# Patient Record
Sex: FEMALE | Race: BLACK OR AFRICAN AMERICAN | NOT HISPANIC OR LATINO | Employment: OTHER | URBAN - METROPOLITAN AREA
[De-identification: names, ages, dates, MRNs, and addresses within clinical notes are randomized per-mention and may not be internally consistent; named-entity substitution may affect disease eponyms.]

---

## 2024-10-07 ENCOUNTER — PATIENT MESSAGE (OUTPATIENT)
Dept: GASTROENTEROLOGY | Facility: CLINIC | Age: 64
End: 2024-10-07
Payer: MEDICARE

## 2024-10-08 ENCOUNTER — PATIENT MESSAGE (OUTPATIENT)
Dept: BARIATRICS | Facility: CLINIC | Age: 64
End: 2024-10-08
Payer: MEDICARE

## 2024-10-09 ENCOUNTER — HOSPITAL ENCOUNTER (OUTPATIENT)
Facility: HOSPITAL | Age: 64
Discharge: HOME | End: 2024-10-09
Attending: STUDENT IN AN ORGANIZED HEALTH CARE EDUCATION/TRAINING PROGRAM | Admitting: STUDENT IN AN ORGANIZED HEALTH CARE EDUCATION/TRAINING PROGRAM
Payer: MEDICARE

## 2024-10-09 VITALS
TEMPERATURE: 98 F | HEIGHT: 66 IN | WEIGHT: 262 LBS | SYSTOLIC BLOOD PRESSURE: 144 MMHG | DIASTOLIC BLOOD PRESSURE: 63 MMHG | HEART RATE: 61 BPM | BODY MASS INDEX: 42.11 KG/M2 | OXYGEN SATURATION: 99 % | RESPIRATION RATE: 18 BRPM

## 2024-10-09 DIAGNOSIS — Z12.11 COLON CANCER SCREENING: ICD-10-CM

## 2024-10-09 LAB
POCT GLUCOSE: 184 MG/DL (ref 70–110)
POCT GLUCOSE: 184 MG/DL (ref 70–110)

## 2024-10-09 PROCEDURE — 45385 COLONOSCOPY W/LESION REMOVAL: CPT | Mod: PT,,, | Performed by: STUDENT IN AN ORGANIZED HEALTH CARE EDUCATION/TRAINING PROGRAM

## 2024-10-09 PROCEDURE — 37000008 HC ANESTHESIA 1ST 15 MINUTES: Performed by: STUDENT IN AN ORGANIZED HEALTH CARE EDUCATION/TRAINING PROGRAM

## 2024-10-09 PROCEDURE — 88305 TISSUE EXAM BY PATHOLOGIST: CPT | Performed by: PATHOLOGY

## 2024-10-09 PROCEDURE — 88305 TISSUE EXAM BY PATHOLOGIST: CPT | Mod: 26,,, | Performed by: PATHOLOGY

## 2024-10-09 PROCEDURE — 82962 GLUCOSE BLOOD TEST: CPT | Performed by: STUDENT IN AN ORGANIZED HEALTH CARE EDUCATION/TRAINING PROGRAM

## 2024-10-09 PROCEDURE — 37000009 HC ANESTHESIA EA ADD 15 MINS: Performed by: STUDENT IN AN ORGANIZED HEALTH CARE EDUCATION/TRAINING PROGRAM

## 2024-10-09 PROCEDURE — 45385 COLONOSCOPY W/LESION REMOVAL: CPT | Mod: PT | Performed by: STUDENT IN AN ORGANIZED HEALTH CARE EDUCATION/TRAINING PROGRAM

## 2024-10-09 PROCEDURE — 27201089 HC SNARE, DISP (ANY): Performed by: STUDENT IN AN ORGANIZED HEALTH CARE EDUCATION/TRAINING PROGRAM

## 2024-10-09 RX ORDER — SODIUM CHLORIDE 0.9 % (FLUSH) 0.9 %
3 SYRINGE (ML) INJECTION
Status: DISCONTINUED | OUTPATIENT
Start: 2024-10-09 | End: 2024-10-09 | Stop reason: HOSPADM

## 2024-10-09 RX ORDER — GLUCAGON 1 MG
1 KIT INJECTION
Status: DISCONTINUED | OUTPATIENT
Start: 2024-10-09 | End: 2024-10-09 | Stop reason: HOSPADM

## 2024-10-09 RX ORDER — SODIUM CHLORIDE 9 MG/ML
INJECTION, SOLUTION INTRAVENOUS CONTINUOUS
Status: DISCONTINUED | OUTPATIENT
Start: 2024-10-09 | End: 2024-10-09 | Stop reason: HOSPADM

## 2024-10-09 NOTE — PROVATION PATIENT INSTRUCTIONS
Discharge Summary/Instructions after an Endoscopic Procedure  Patient Name: Africa Burgos  Patient MRN: [id number]  Patient   YOB: 1960 Wednesday, October 9, 2024  Davi Vo MD  Dear patient,  As a result of recent federal legislation (The Federal Cures Act), you may   receive lab or pathology results from your procedure in your [facility]   account before your physician is able to contact you. Your physician or   their representative will relay the results to you with their   recommendations at their soonest availability.  Thank you,  RESTRICTIONS:  During your procedure today, you received medications for sedation.  These   medications may affect your judgment, balance and coordination.  Therefore,   for 24 hours, you have the following restrictions:   - DO NOT drive a car, operate machinery, make legal/financial decisions,   sign important papers or drink alcohol.    ACTIVITY:  Today: no heavy lifting, straining or running due to procedural   sedation/anesthesia.  The following day: return to full activity including work.  DIET:  Eat and drink normally unless instructed otherwise.     TREATMENT FOR COMMON SIDE EFFECTS:  - Mild abdominal pain, nausea, belching, bloating or excessive gas:  rest,   eat lightly and use a heating pad.  - Sore Throat: treat with throat lozenges and/or gargle with warm salt   water.  - Because air was used during the procedure, expelling large amounts of air   from your rectum or belching is normal.  - If a bowel prep was taken, you may not have a bowel movement for 1-3 days.    This is normal.  SYMPTOMS TO WATCH FOR AND REPORT TO YOUR PHYSICIAN:  1. Abdominal pain or bloating, other than gas cramps.  2. Chest pain.  3. Back pain.  4. Signs of infection such as: chills or fever occurring within 24 hours   after the procedure.  5. Rectal bleeding, which would show as bright red, maroon, or black stools.   (A tablespoon of blood from the rectum is not serious,  especially if   hemorrhoids are present.)  6. Vomiting.  7. Weakness or dizziness.  GO DIRECTLY TO THE NEAREST EMERGENCY ROOM IF YOU HAVE ANY OF THE FOLLOWING:      Difficulty breathing              Chills and/or fever over 101 F   Persistent vomiting and/or vomiting blood   Severe abdominal pain   Severe chest pain   Black, tarry stools   Bleeding- more than one tablespoon   Any other symptom or condition that you feel may need urgent attention  Your doctor recommends these additional instructions:  If any biopsies were taken, your doctors clinic will contact you in 1 to 2   weeks with any results.  - Discharge patient to home (ambulatory).   - Patient has a contact number available for emergencies.  The signs and   symptoms of potential delayed complications were discussed with the   patient.  Return to normal activities tomorrow.  Written discharge   instructions were provided to the patient.   - Resume previous diet.    - Return to primary care physician as previously scheduled.   - Resume anticoagulation tomorrow  - Repeat colonoscopy in 1 year for surveillance.  For questions, problems or results please call your physician - Davi Vo MD at Work:  [phone].  [facility], EMERGENCY ROOM PHONE NUMBER: [phone]  IF A COMPLICATION OR EMERGENCY SITUATION ARISES AND YOU ARE UNABLE TO REACH   YOUR PHYSICIAN - GO DIRECTLY TO THE EMERGENCY ROOM.  MD Davi Hancock MD  10/9/2024 10:14:17 AM  This report has been verified and signed electronically.  Dear patient,  As a result of recent federal legislation (The Federal Cures Act), you may   receive lab or pathology results from your procedure in your [facility]   account before your physician is able to contact you. Your physician or   their representative will relay the results to you with their   recommendations at their soonest availability.  Thank you,  PROVATION

## 2024-10-09 NOTE — H&P
Short Stay Endoscopy History and Physical    PCP - Efren Small MD    Procedure - Colonoscopy  ASA - per anesthesia  Mallampati - per anesthesia  History of Anesthesia problems - no  Family history Anesthesia problems -  no   Plan of anesthesia - General    HPI:  This is a 63 y.o. female here for evaluation of : screening, previous colonoscopy in 2018 could not reach cecum due to redundant colon and looping.     ROS:  Constitutional: No fevers, chills  CV: No chest pain  Pulm: No shortness of breath  GI: see HPI  Derm: No rash    Medical History:  has a past medical history of Abnormal thyroid blood test (11/30/2016), Anxiety and depression (08/21/2014), Chronic hepatitis C without hepatic coma (07/10/2013), Dehiscence of operative wound, Diabetes mellitus, Encounter for blood transfusion, Enterocutaneous fistula, GERD (gastroesophageal reflux disease), Hepatitis C, Hernia of unspecified site of abdominal cavity without mention of obstruction or gangrene, Hypertension, Liver fibrosis (12/06/2018), Lupus, and Sleep apnea.    Surgical History:  has a past surgical history that includes Small intestine surgery; Tubal ligation; Appendectomy; Inguinal hernia repair (Right); Colonoscopy; Upper gastrointestinal endoscopy; Liver biopsy; Colonoscopy (N/A, 12/28/2018); Left heart catheterization (Left, 01/13/2021); Total knee arthroplasty (Right, 06/13/2022); Knee surgery; and ablation, svt, accessory pathway (N/A, 2/1/2024).    Family History: family history includes Diabetes in her mother; Hypertension in her mother; Kidney failure in her father; Lung cancer in her paternal grandfather.. Otherwise no colon cancer, inflammatory bowel disease, or GI malignancies.    Social History:  reports that she has never smoked. She has never been exposed to tobacco smoke. She has never used smokeless tobacco. She reports current alcohol use. She reports that she does not use drugs.    Review of patient's allergies  indicates:  No Known Allergies    Medications:   Medications Prior to Admission   Medication Sig Dispense Refill Last Dose/Taking    atenoloL (TENORMIN) 100 MG tablet Take 100 mg by mouth once daily.   10/8/2024    atorvastatin (LIPITOR) 20 MG tablet Take 1 tablet (20 mg total) by mouth once daily. 90 tablet 3 10/8/2024    chlorthalidone (HYGROTEN) 25 MG Tab Take 25 mg by mouth.   10/8/2024    enoxaparin (LOVENOX) 120 mg/0.8 mL Syrg Inject 0.8 mLs (120 mg total) into the skin 2 (two) times daily. 20 each 1 10/9/2024 Morning    glipiZIDE (GLUCOTROL) 5 MG tablet    10/8/2024    JANUVIA 100 mg Tab TAKE 1 TABLET EVERY DAY BY ORAL ROUTE FOR 90 DAYS.   10/8/2024    JARDIANCE 10 mg tablet Take 10 mg by mouth.   10/8/2024    metFORMIN (GLUCOPHAGE) 500 MG tablet Take 500 mg by mouth.   10/8/2024    nortriptyline (PAMELOR) 25 MG capsule Take 25 mg by mouth every evening.   10/8/2024    pregabalin (LYRICA) 200 MG Cap TAKE 1 CAPSULE(S) BY MOUTH TWO TIMES DAILY   10/8/2024    ACCU-CHEK GRZEGORZ PLUS TEST STRP Strp TEST BLOOD SUGAR ONCCE OR TWICE DAILY       ACCU-CHEK GUIDE ME GLUCOSE MTR Misc use as directed       ACCU-CHEK SOFTCLIX LANCETS Misc        apixaban (ELIQUIS) 5 mg Tab Take 1 tablet (5 mg total) by mouth 2 (two) times daily. 60 tablet 3 10/7/2024    cholecalciferol, vitamin D3, 1,250 mcg (50,000 unit) Tab Take 1,250 mcg by mouth every 7 days. 12 tablet 1     clotrimazole (LOTRIMIN) 1 % cream SMARTSIG:Topical Morning-Evening       colchicine (COLCRYS) 0.6 mg tablet Take 1 tablet (0.6 mg total) by mouth 2 (two) times daily. 30 tablet 0     dicyclomine (BENTYL) 10 MG capsule Take 10 mg by mouth 4 (four) times daily before meals and nightly.       GAVILYTE-C 240-22.72-6.72 -5.84 gram SolR SMARTSI Milliliter(s) By Mouth Daily       LIDOcaine (LIDODERM) 5 % Place 1 patch onto the skin once daily. Remove & Discard patch within 12 hours or as directed by MD 15 patch 0     linaCLOtide (LINZESS) 72 mcg Cap capsule Take 1  capsule by mouth once daily. As needed            Physical Exam:    Vital Signs:   Vitals:    10/09/24 0747   BP: 133/68   Pulse: 61   Resp: 16   Temp: 98.1 °F (36.7 °C)       General Appearance: Well appearing in no acute distress  Eyes:    No scleral icterus  ENT: lips and tongue normal  Lungs: no use of accessory muscles  Heart:  normal rate, regular rhythm  Abdomen: Soft, non tender, non distended   Extremities: no edema  Skin: No rash      Labs:  Lab Results   Component Value Date    WBC 10.28 08/09/2024    HGB 12.6 08/09/2024    HCT 38.5 08/09/2024     08/09/2024    CHOL 159 12/19/2023    TRIG 154 (H) 12/19/2023    HDL 41 12/19/2023    ALT 13 08/09/2024    AST 13 08/09/2024     08/09/2024    K 4.4 08/09/2024     08/09/2024    CREATININE 1.0 08/09/2024    BUN 13 08/09/2024    CO2 24 08/09/2024    TSH 3.521 12/19/2023    INR 1.03 07/13/2024    HGBA1C 9.1 (H) 06/22/2024       I have explained the risks and benefits of endoscopy procedures to the patient including but not limited to bleeding, perforation, infection, and death.  The patient was asked if they understand and allowed to ask any further questions to their satisfaction.    Davi Vo MD

## 2024-10-10 LAB
FINAL PATHOLOGIC DIAGNOSIS: NORMAL
GROSS: NORMAL
Lab: NORMAL

## 2024-10-17 NOTE — PROGRESS NOTES
Dear Ms. Ricci Burgos:    It was my pleasure seeing you at the time of your recent colonoscopy at [facility].    I am happy to report the polyp(s) we removed were benign. The polyp(s) were Tubular adenoma without evidence of cancer or suspicious change.    Based on these findings and the findings at the time of your procedure, I recommend you follow-up with a repeat colonoscopy in approximately 1 Year    I will forward a copy of this letter to your primary care/referring physician. Please do not hesitate to contact me if you have any questions regarding this letter.    Sincerely,    Davi Vo MD

## 2024-10-18 ENCOUNTER — ANESTHESIA EVENT (OUTPATIENT)
Dept: SURGERY | Facility: OTHER | Age: 64
End: 2024-10-18
Payer: MEDICARE

## 2024-10-18 ENCOUNTER — HOSPITAL ENCOUNTER (OUTPATIENT)
Dept: PREADMISSION TESTING | Facility: OTHER | Age: 64
Discharge: HOME | End: 2024-10-18
Attending: OBSTETRICS & GYNECOLOGY
Payer: MEDICARE

## 2024-10-18 VITALS
WEIGHT: 268 LBS | DIASTOLIC BLOOD PRESSURE: 81 MMHG | BODY MASS INDEX: 43.07 KG/M2 | HEART RATE: 63 BPM | OXYGEN SATURATION: 98 % | RESPIRATION RATE: 16 BRPM | SYSTOLIC BLOOD PRESSURE: 166 MMHG | TEMPERATURE: 98 F | HEIGHT: 66 IN

## 2024-10-18 RX ORDER — SODIUM CHLORIDE, SODIUM LACTATE, POTASSIUM CHLORIDE, CALCIUM CHLORIDE 600; 310; 30; 20 MG/100ML; MG/100ML; MG/100ML; MG/100ML
INJECTION, SOLUTION INTRAVENOUS CONTINUOUS
Status: CANCELLED | OUTPATIENT
Start: 2024-10-18

## 2024-10-18 RX ORDER — CHOLECALCIFEROL (VITAMIN D3) 1250 MCG
1250 TABLET ORAL
COMMUNITY

## 2024-10-18 RX ORDER — LIDOCAINE HYDROCHLORIDE 10 MG/ML
0.5 INJECTION, SOLUTION EPIDURAL; INFILTRATION; INTRACAUDAL; PERINEURAL ONCE
Status: CANCELLED | OUTPATIENT
Start: 2024-10-18 | End: 2024-10-18

## 2024-10-18 NOTE — ANESTHESIA PREPROCEDURE EVALUATION
10/18/2024  Africa Burgos is a 63 y.o., female.      Pre-op Assessment    I have reviewed the Patient Summary Reports.     I have reviewed the Nursing Notes. I have reviewed the NPO Status.   I have reviewed the Medications.     Review of Systems  Anesthesia Hx:  No previous Anesthesia             Denies Family Hx of Anesthesia complications.    Denies Personal Hx of Anesthesia complications.                    Social:  Non-Smoker       Hematology/Oncology:  Hematology Normal   Oncology Normal                                   EENT/Dental:  EENT/Dental Normal           Cardiovascular:     Hypertension, well controlled    Dysrhythmias           Ablation 2024 for SVT                           Pulmonary:        Sleep Apnea PE in past  Will wean Eliquis               Renal/:  Renal/ Normal                 Hepatic/GI:     GERD Liver Disease, Hepatitis, C Treated Hep C             Musculoskeletal:  Arthritis               Neurological:  Neurology Normal                                      Endocrine:  Diabetes, type 2         Morbid Obesity / BMI > 40  Dermatological:  Skin Normal    Psych:  Psychiatric History  depression              Physical Exam  General: Alert and Oriented    Airway:  Mallampati: II   Mouth Opening: Normal  Neck ROM: Normal ROM    Dental:  Partial Dentures    Anesthesia Plan  Type of Anesthesia, risks & benefits discussed:    Anesthesia Type: Gen ETT, Gen Supraglottic Airway  Intra-op Monitoring Plan: Standard ASA Monitors  Post Op Pain Control Plan: multimodal analgesia  Induction:  IV  Informed Consent: Informed consent signed with the Patient and all parties understand the risks and agree with anesthesia plan.  All questions answered.   ASA Score: 3  Anesthesia Plan Notes: [facility]    Ready For Surgery From Anesthesia Perspective.     .

## 2024-10-18 NOTE — DISCHARGE INSTRUCTIONS
Information to Prepare you for your Surgery    PRE-ADMIT TESTING   [address]  [place] [address]  [address]     Your surgery has been scheduled at [facility]. We are pleased to have the opportunity to serve you. For Further Information please call [phone].    On the day of surgery please report to Registration on the 1st floor of the [facility].    CONTACT YOUR PHYSICIAN'S OFFICE THE DAY PRIOR TO YOUR SURGERY TO OBTAIN YOUR ARRIVAL TIME.     The evening before surgery do not eat anything after 9 p.m. ( this includes hard candy, chewing gum and mints).  You may only have WATER  from 9 p.m. until you leave your home.   DO NOT DRINK ANY LIQUIDS ON THE WAY TO THE HOSPITAL.      If you are a diabetic-drink only water prior to surgery.    Outpatient Surgery- May allow 2 adults (18 and older)/ Support Persons (1 being the designated ) for all surgical/procedural patients. A breastfeeding mother will be allowed her infant and 2 adult Support Persons. No one under the age of 18 will be allowed in the building.    MEDICATION INSTRUCTIONS: TAKE medications checked off by the Anesthesiologist on your Medication List.    Surgery Patients:  If you take ASPIRIN - Your PHYSICIAN/SURGEON will need to inform you IF/OR when you need to stop taking aspirin prior to your surgery.     Starting the week prior to surgery, do not take any medications containing IBUPROFEN or NSAIDS (Advil, Aleve, BC, Celebrex, Goody's, Ketorolac, Meloxicam, Mobic, Motrin, Naproxen, Toradol, etc).  If you are not sure if you should take a medicine please call your surgeon's office.  You may take Tylenol.    Do Not Wear any make-up (especially eye make-up) to surgery. Please remove any false eyelashes or eyelash extensions. If you arrive the day of surgery with makeup/eyelashes on you will be required to remove prior to surgery. (There is a risk of corneal abrasions if eye makeup/eyelash extensions are not  removed)    Leave all valuables at home.   Do Not wear any jewelry or watches, including any metal in body piercings. Jewelry must be removed prior to coming to the hospital.  There is a possibility that rings that are unable to be removed may be cut off if they are on the surgical extremity.    Please remove all hair extensions, wigs, clips and any other metal accessories/ ornaments from your hair.  These items may pose a flammable/fire risk in Surgery and must be removed.    Do not shave your surgical area at least 5 days prior to your surgery. The surgical prep will be performed at the hospital according to Infection Control regulations.    Contact Lens must be removed before surgery. Either do not wear the contact lens or bring a case and solution for storage.  Please bring a container for eyeglasses or dentures as required.  Bring any paperwork your physician has provided, such as consent forms,  history and physicals, doctor's orders, etc.   Bring comfortable clothes that are loose fitting to wear upon discharge. Take into consideration the type of surgery being performed.  Maintain your diet as advised per your physician the day prior to surgery.    Adequate rest the night before surgery is advised.   Park in the Parking lot behind the hospital or in the [employer] [facility] across the street from the parking lot. Parking is complimentary.  If you will be discharged the same day as your procedure, please arrange for a responsible adult to drive you home or to accompany you if traveling by taxi.   YOU WILL NOT BE PERMITTED TO DRIVE OR TO LEAVE THE HOSPITAL ALONE AFTER SURGERY.   If you are being discharged the same day, it is strongly recommended that you arrange for someone to remain with you for the first 24 hrs following your surgery.    The Surgeon will speak to your family/visitor after your surgery regarding the outcome of your surgery and post op care.  The Surgeon may speak to you after your  surgery, but there is a possibility you may not remember the details.  Please check with your family members regarding the conversation with the Surgeon.    We strongly recommend whoever is bringing you home be present for discharge instructions.  This will ensure a thorough understanding for your post op home care.              Bathing Instructions with Hibiclens  Shower the evening before and morning of your procedure with Chlorhexidine (Hibiclens)    Do not use Chlorhexidine on your face or genitals. Do not get in your eyes.  Wash your face with water and your regular face wash/soap  Use your regular shampoo  Apply Chlorhexidine (Hibiclens) directly on your skin or on a wet washcloth and wash gently. When showering: Move away from the shower stream when applying Chlorhexidine (Hibiclens) to avoid rinsing off too soon.  Rinse thoroughly with warm water  Do not dilute Chlorhexidine (Hibiclens)   Dry off as usual, do not use any deodorant, powder, body lotions, perfume, after shave or cologne.     If the patient has fever, cough, or signs/symptoms of Flu or Covid please do not come in for your surgery.   Contact your surgeon and your primary care physician for further instructions.     If applicable, please bring your blood pressure & diabetes medications the day of surgery.

## 2024-10-21 ENCOUNTER — TELEPHONE (OUTPATIENT)
Dept: ENDOSCOPY | Facility: HOSPITAL | Age: 64
End: 2024-10-21

## 2024-10-21 ENCOUNTER — CLINICAL SUPPORT (OUTPATIENT)
Dept: ENDOSCOPY | Facility: HOSPITAL | Age: 64
End: 2024-10-21
Payer: MEDICARE

## 2024-10-21 DIAGNOSIS — Z12.11 ENCOUNTER FOR COLORECTAL CANCER SCREENING: ICD-10-CM

## 2024-10-21 DIAGNOSIS — Z12.12 ENCOUNTER FOR COLORECTAL CANCER SCREENING: ICD-10-CM

## 2024-10-21 NOTE — PLAN OF CARE
Called pt to schedule PAT. Pt had her c-scope recently, so no new scheduling needed today. Pt due in 1 year.

## 2024-10-22 ENCOUNTER — TELEPHONE (OUTPATIENT)
Dept: HEMATOLOGY/ONCOLOGY | Facility: CLINIC | Age: 64
End: 2024-10-22
Payer: MEDICARE

## 2024-10-22 ENCOUNTER — OFFICE VISIT (OUTPATIENT)
Dept: HEMATOLOGY/ONCOLOGY | Facility: CLINIC | Age: 64
End: 2024-10-22
Payer: MEDICARE

## 2024-10-22 VITALS
WEIGHT: 270.75 LBS | HEIGHT: 66 IN | RESPIRATION RATE: 20 BRPM | SYSTOLIC BLOOD PRESSURE: 142 MMHG | OXYGEN SATURATION: 98 % | DIASTOLIC BLOOD PRESSURE: 73 MMHG | HEART RATE: 78 BPM | BODY MASS INDEX: 43.51 KG/M2

## 2024-10-22 DIAGNOSIS — R07.9 ACUTE CHEST PAIN: ICD-10-CM

## 2024-10-22 DIAGNOSIS — I26.99 BILATERAL PULMONARY EMBOLISM: Primary | ICD-10-CM

## 2024-10-22 DIAGNOSIS — R06.09 DYSPNEA ON EXERTION: ICD-10-CM

## 2024-10-22 PROCEDURE — 3077F SYST BP >= 140 MM HG: CPT | Mod: CPTII,S$GLB,, | Performed by: INTERNAL MEDICINE

## 2024-10-22 PROCEDURE — 99204 OFFICE O/P NEW MOD 45 MIN: CPT | Mod: S$GLB,,, | Performed by: INTERNAL MEDICINE

## 2024-10-22 PROCEDURE — 1159F MED LIST DOCD IN RCRD: CPT | Mod: CPTII,S$GLB,, | Performed by: INTERNAL MEDICINE

## 2024-10-22 PROCEDURE — 3046F HEMOGLOBIN A1C LEVEL >9.0%: CPT | Mod: CPTII,S$GLB,, | Performed by: INTERNAL MEDICINE

## 2024-10-22 PROCEDURE — 99999 PR PBB SHADOW E&M-EST. PATIENT-LVL IV: CPT | Mod: PBBFAC,,, | Performed by: INTERNAL MEDICINE

## 2024-10-22 PROCEDURE — 3008F BODY MASS INDEX DOCD: CPT | Mod: CPTII,S$GLB,, | Performed by: INTERNAL MEDICINE

## 2024-10-22 PROCEDURE — 3078F DIAST BP <80 MM HG: CPT | Mod: CPTII,S$GLB,, | Performed by: INTERNAL MEDICINE

## 2024-10-22 NOTE — PROGRESS NOTES
PATIENT: Africa Wynne  MRN: [id number]  DATE: 10/22/2024      Subjective:     Chief complaint:  Chief Complaint   Patient presents with    pulmonary embolism    Follow-up       HEME History:    Africa Burgos is a 63 yr old female, new to Hem and this provider, referred for leukocytosis and PE 2024.    - Pt with intermittent elevated wbc since , first starting with long term hospitalizations in  (2016 surgical wound infection s/p hernia repair) and  (2017 enterocolic fistula, sepsis), intermittent since , again peaking 2024 during hospitalization (bilat PE) and improved to 12.8 K/uL on 24.     -Consulted by Dr WILLIAM Anderson for anticoagulation reccomendations while inpatient 24 for bilat PE  - Beta 2 glycoprotein antibodies, cardiolipin antibody negative on 24, DRVVT negative 24  - States she has neck, upper back, chest to bilat axillary tenderness multipoint c/w fibromyalgia, this point tenderness is what she is referring to across the chest wall  - endorses lightheadedness mild and intermittent, LUI that has significantly improved since recent hospitalization for PE, constipation managed by linzess  - she reports right sided abdominal pain intermittently site of previous hernia repair   - Appetite normal, states weight is stable, no unintended weight loss  - Denies nightsweats, headaches, epistaxis, oral bleeding, chest pain, hemoptysis, cough, n/v/d, hematemesis, melena, hematuria, easy bruising, fevers, unintended weight loss, night sweats.  - She is tolerating Eliquis 5mg bid without side effects    Family history  Mother  age 72yrs from brain aneurysm complications, diabetes.  Father  age 74yrs uncertain actual death cause.  No family history of anemia, cancers.   Maternal cousin, in his 30s, has blood clots with multiple medical problems, heart problems, on dialysis.       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  Denies nightsweats, headaches, epistaxis, oral bleeding, chest pain, hemoptysis, cough, abdominal pain, n/v/d, hematemesis, melena, hematuria, easy bruising, fevers, unintended weight loss, night sweats.               Interval History: Ms. Wynne returns for follow up. Had previously seen Kaleigh Florence. Patient still on Eliquis (though currently on lovenox bridge periprocedurally for hysteroscopy later this week). She notes ongoing issues with dyspnea on exertion which had not been an issue prior to the PE. She feels like it hasn't really improved since her last appointment with HUGO Florence.       Review of Systems   A comprehensive review of systems was performed; pertinent positives and negatives are noted in the HPI.        Objective:      Vitals:   Vitals:    10/22/24 1317   BP: (!) 142/73   BP Location: Right arm   Patient Position: Sitting   Pulse: 78   Resp: 20   SpO2: 98%   Weight: 122.8 kg (270 lb 11.6 oz)   Height: 5' 6" (1.676 m)     BMI: Body mass index is 43.7 kg/m².      Physical Exam:   Physical Exam  Vitals and nursing note reviewed.   Constitutional:       General: She is not in acute distress.     Appearance: Normal appearance. She is not toxic-appearing.   HENT:      Head: Normocephalic and atraumatic.   Eyes:      General: No scleral icterus.     Conjunctiva/sclera: Conjunctivae normal.   Cardiovascular:      Rate and Rhythm: Normal rate and regular rhythm.      Heart sounds: Normal heart sounds. No murmur " heard.  Pulmonary:      Effort: Pulmonary effort is normal. No respiratory distress.      Breath sounds: Normal breath sounds. No wheezing, rhonchi or rales.   Abdominal:      General: There is no distension.      Palpations: Abdomen is soft.      Tenderness: There is no abdominal tenderness.   Musculoskeletal:         General: No swelling, tenderness or deformity.      Cervical back: Neck supple. No tenderness.   Skin:     Coloration: Skin is not jaundiced.      Findings: No erythema.   Neurological:      General: No focal deficit present.      Mental Status: She is alert and oriented to person, place, and time.      Motor: No weakness.   Psychiatric:         Mood and Affect: Mood normal.         Behavior: Behavior normal.           Laboratory Data:  WBC   Date Value Ref Range Status   08/09/2024 10.28 3.90 - 12.70 K/uL Final     Hemoglobin   Date Value Ref Range Status   08/09/2024 12.6 12.0 - 16.0 g/dL Final     POC Hematocrit   Date Value Ref Range Status   06/04/2019 38 36 - 54 %PCV Final     Hematocrit   Date Value Ref Range Status   08/09/2024 38.5 37.0 - 48.5 % Final     Platelets   Date Value Ref Range Status   08/09/2024 211 150 - 450 K/uL Final     Gran # (ANC)   Date Value Ref Range Status   08/09/2024 5.6 1.8 - 7.7 K/uL Final     Gran %   Date Value Ref Range Status   08/09/2024 54.7 38.0 - 73.0 % Final       Chemistry        Component Value Date/Time     08/09/2024 0854    K 4.4 08/09/2024 0854     08/09/2024 0854    CO2 24 08/09/2024 0854    BUN 13 08/09/2024 0854    CREATININE 1.0 08/09/2024 0854     (H) 08/09/2024 0854        Component Value Date/Time    CALCIUM 9.5 08/09/2024 0854    ALKPHOS 44 (L) 08/09/2024 0854    AST 13 08/09/2024 0854    ALT 13 08/09/2024 0854    BILITOT 0.3 08/09/2024 0854    ESTGFRAFRICA >60.0 07/09/2022 1546    EGFRNONAA >60.0 07/09/2022 1546              Assessment/Plan:     1. Bilateral pulmonary embolism    2. Acute chest pain    3. Dyspnea on exertion       Continue anticoagulation.  Given ongoing symptomatology of LUI and SOB, will repeat CT PE. TTE in August was fairly unremarkable. Will also order PFTs; low threshold for pulmonology referral.     Med and Orders:  Orders Placed This Encounter    CTA Chest Non-Coronary (PE Studies)    Creatinine, serum    Complete PFT w/ bronchodilator       Follow Up:  Follow up in about 3 months (around 1/22/2025).      Above care plan was discussed with patient and all questions were addressed to their expressed satisfaction.       Jayson Tabor MD, FACP  Hematology & Medical Oncology  [facility]

## 2024-10-24 ENCOUNTER — HOSPITAL ENCOUNTER (OUTPATIENT)
Facility: OTHER | Age: 64
Discharge: HOME | End: 2024-10-24
Attending: OBSTETRICS & GYNECOLOGY | Admitting: OBSTETRICS & GYNECOLOGY
Payer: MEDICARE

## 2024-10-24 ENCOUNTER — ANESTHESIA (OUTPATIENT)
Dept: SURGERY | Facility: OTHER | Age: 64
End: 2024-10-24
Payer: MEDICARE

## 2024-10-24 DIAGNOSIS — R93.89 INCREASED ENDOMETRIAL STRIPE THICKNESS: ICD-10-CM

## 2024-10-24 DIAGNOSIS — Z98.890 STATUS POST HYSTEROSCOPY: Primary | ICD-10-CM

## 2024-10-24 LAB
POCT GLUCOSE: 158 MG/DL (ref 70–110)
POCT GLUCOSE: 178 MG/DL (ref 70–110)
POCT GLUCOSE: 316 MG/DL (ref 70–110)

## 2024-10-24 PROCEDURE — 25000003 PHARM REV CODE 250: Performed by: STUDENT IN AN ORGANIZED HEALTH CARE EDUCATION/TRAINING PROGRAM

## 2024-10-24 PROCEDURE — 71000015 HC POSTOP RECOV 1ST HR: Performed by: OBSTETRICS & GYNECOLOGY

## 2024-10-24 PROCEDURE — 88305 TISSUE EXAM BY PATHOLOGIST: CPT | Mod: 26,,, | Performed by: PATHOLOGY

## 2024-10-24 PROCEDURE — 36000707: Performed by: OBSTETRICS & GYNECOLOGY

## 2024-10-24 PROCEDURE — 71000039 HC RECOVERY, EACH ADD'L HOUR: Performed by: OBSTETRICS & GYNECOLOGY

## 2024-10-24 PROCEDURE — 63600175 PHARM REV CODE 636 W HCPCS: Performed by: NURSE ANESTHETIST, CERTIFIED REGISTERED

## 2024-10-24 PROCEDURE — 88305 TISSUE EXAM BY PATHOLOGIST: CPT | Mod: 59 | Performed by: PATHOLOGY

## 2024-10-24 PROCEDURE — 37000009 HC ANESTHESIA EA ADD 15 MINS: Performed by: OBSTETRICS & GYNECOLOGY

## 2024-10-24 PROCEDURE — 58558 HYSTEROSCOPY BIOPSY: CPT | Mod: ,,, | Performed by: OBSTETRICS & GYNECOLOGY

## 2024-10-24 PROCEDURE — 63600175 PHARM REV CODE 636 W HCPCS: Performed by: STUDENT IN AN ORGANIZED HEALTH CARE EDUCATION/TRAINING PROGRAM

## 2024-10-24 PROCEDURE — 27201423 OPTIME MED/SURG SUP & DEVICES STERILE SUPPLY: Performed by: OBSTETRICS & GYNECOLOGY

## 2024-10-24 PROCEDURE — 71000033 HC RECOVERY, INTIAL HOUR: Performed by: OBSTETRICS & GYNECOLOGY

## 2024-10-24 PROCEDURE — 25000003 PHARM REV CODE 250: Performed by: OBSTETRICS & GYNECOLOGY

## 2024-10-24 PROCEDURE — C1782 MORCELLATOR: HCPCS | Performed by: OBSTETRICS & GYNECOLOGY

## 2024-10-24 PROCEDURE — 71000016 HC POSTOP RECOV ADDL HR: Performed by: OBSTETRICS & GYNECOLOGY

## 2024-10-24 PROCEDURE — 36000706: Performed by: OBSTETRICS & GYNECOLOGY

## 2024-10-24 PROCEDURE — 25000003 PHARM REV CODE 250: Performed by: ANESTHESIOLOGY

## 2024-10-24 PROCEDURE — 37000008 HC ANESTHESIA 1ST 15 MINUTES: Performed by: OBSTETRICS & GYNECOLOGY

## 2024-10-24 RX ORDER — FAMOTIDINE 20 MG/1
20 TABLET, FILM COATED ORAL
Status: DISCONTINUED | OUTPATIENT
Start: 2024-10-24 | End: 2024-10-24 | Stop reason: HOSPADM

## 2024-10-24 RX ORDER — HYDROMORPHONE HYDROCHLORIDE 2 MG/ML
0.4 INJECTION, SOLUTION INTRAMUSCULAR; INTRAVENOUS; SUBCUTANEOUS EVERY 5 MIN PRN
Status: DISCONTINUED | OUTPATIENT
Start: 2024-10-24 | End: 2024-10-24 | Stop reason: HOSPADM

## 2024-10-24 RX ORDER — LIDOCAINE HYDROCHLORIDE 10 MG/ML
0.5 INJECTION, SOLUTION EPIDURAL; INFILTRATION; INTRACAUDAL; PERINEURAL ONCE
Status: DISCONTINUED | OUTPATIENT
Start: 2024-10-24 | End: 2024-10-24 | Stop reason: HOSPADM

## 2024-10-24 RX ORDER — PROCHLORPERAZINE EDISYLATE 5 MG/ML
5 INJECTION INTRAMUSCULAR; INTRAVENOUS EVERY 30 MIN PRN
Status: DISCONTINUED | OUTPATIENT
Start: 2024-10-24 | End: 2024-10-24 | Stop reason: HOSPADM

## 2024-10-24 RX ORDER — MEPERIDINE HYDROCHLORIDE 25 MG/ML
12.5 INJECTION INTRAMUSCULAR; INTRAVENOUS; SUBCUTANEOUS ONCE AS NEEDED
Status: DISCONTINUED | OUTPATIENT
Start: 2024-10-24 | End: 2024-10-24 | Stop reason: HOSPADM

## 2024-10-24 RX ORDER — MUPIROCIN 20 MG/G
OINTMENT TOPICAL
Status: DISCONTINUED | OUTPATIENT
Start: 2024-10-24 | End: 2024-10-24 | Stop reason: HOSPADM

## 2024-10-24 RX ORDER — OXYCODONE HYDROCHLORIDE 5 MG/1
5 TABLET ORAL
Status: DISCONTINUED | OUTPATIENT
Start: 2024-10-24 | End: 2024-10-24 | Stop reason: HOSPADM

## 2024-10-24 RX ORDER — KETOROLAC TROMETHAMINE 30 MG/ML
INJECTION, SOLUTION INTRAMUSCULAR; INTRAVENOUS
Status: DISCONTINUED | OUTPATIENT
Start: 2024-10-24 | End: 2024-10-24

## 2024-10-24 RX ORDER — HYDROCODONE BITARTRATE AND ACETAMINOPHEN 5; 325 MG/1; MG/1
1 TABLET ORAL EVERY 6 HOURS PRN
Qty: 5 TABLET | Refills: 0 | Status: SHIPPED | OUTPATIENT
Start: 2024-10-24

## 2024-10-24 RX ORDER — LIDOCAINE HYDROCHLORIDE 20 MG/ML
INJECTION INTRAVENOUS
Status: DISCONTINUED | OUTPATIENT
Start: 2024-10-24 | End: 2024-10-24

## 2024-10-24 RX ORDER — PROPOFOL 10 MG/ML
VIAL (ML) INTRAVENOUS
Status: DISCONTINUED | OUTPATIENT
Start: 2024-10-24 | End: 2024-10-24

## 2024-10-24 RX ORDER — SODIUM CHLORIDE 0.9 % (FLUSH) 0.9 %
3 SYRINGE (ML) INJECTION
Status: DISCONTINUED | OUTPATIENT
Start: 2024-10-24 | End: 2024-10-24 | Stop reason: HOSPADM

## 2024-10-24 RX ORDER — FENTANYL CITRATE 50 UG/ML
INJECTION, SOLUTION INTRAMUSCULAR; INTRAVENOUS
Status: DISCONTINUED | OUTPATIENT
Start: 2024-10-24 | End: 2024-10-24

## 2024-10-24 RX ORDER — SODIUM CHLORIDE 9 MG/ML
INJECTION, SOLUTION INTRAVENOUS CONTINUOUS
Status: DISCONTINUED | OUTPATIENT
Start: 2024-10-24 | End: 2024-10-24 | Stop reason: HOSPADM

## 2024-10-24 RX ORDER — ONDANSETRON HYDROCHLORIDE 2 MG/ML
INJECTION, SOLUTION INTRAVENOUS
Status: DISCONTINUED | OUTPATIENT
Start: 2024-10-24 | End: 2024-10-24

## 2024-10-24 RX ORDER — IBUPROFEN 600 MG/1
600 TABLET ORAL 3 TIMES DAILY
Qty: 30 TABLET | Refills: 0 | Status: SHIPPED | OUTPATIENT
Start: 2024-10-24

## 2024-10-24 RX ORDER — GLUCAGON 1 MG
1 KIT INJECTION
Status: DISCONTINUED | OUTPATIENT
Start: 2024-10-24 | End: 2024-10-24 | Stop reason: HOSPADM

## 2024-10-24 RX ORDER — SODIUM CHLORIDE, SODIUM LACTATE, POTASSIUM CHLORIDE, CALCIUM CHLORIDE 600; 310; 30; 20 MG/100ML; MG/100ML; MG/100ML; MG/100ML
INJECTION, SOLUTION INTRAVENOUS CONTINUOUS
Status: DISCONTINUED | OUTPATIENT
Start: 2024-10-24 | End: 2024-10-24 | Stop reason: HOSPADM

## 2024-10-24 RX ORDER — DEXAMETHASONE SODIUM PHOSPHATE 4 MG/ML
INJECTION, SOLUTION INTRA-ARTICULAR; INTRALESIONAL; INTRAMUSCULAR; INTRAVENOUS; SOFT TISSUE
Status: DISCONTINUED | OUTPATIENT
Start: 2024-10-24 | End: 2024-10-24

## 2024-10-24 RX ADMIN — ONDANSETRON HYDROCHLORIDE 4 MG: 2 INJECTION INTRAMUSCULAR; INTRAVENOUS at 12:10

## 2024-10-24 RX ADMIN — PROPOFOL 200 MG: 10 INJECTION, EMULSION INTRAVENOUS at 12:10

## 2024-10-24 RX ADMIN — OXYCODONE HYDROCHLORIDE 5 MG: 5 TABLET ORAL at 01:10

## 2024-10-24 RX ADMIN — SODIUM CHLORIDE: 0.9 INJECTION, SOLUTION INTRAVENOUS at 12:10

## 2024-10-24 RX ADMIN — KETOROLAC TROMETHAMINE 30 MG: 30 INJECTION, SOLUTION INTRAMUSCULAR; INTRAVENOUS at 01:10

## 2024-10-24 RX ADMIN — FENTANYL CITRATE 50 MCG: 50 INJECTION, SOLUTION INTRAMUSCULAR; INTRAVENOUS at 12:10

## 2024-10-24 RX ADMIN — LIDOCAINE HYDROCHLORIDE 100 MG: 20 INJECTION, SOLUTION INTRAVENOUS at 12:10

## 2024-10-24 RX ADMIN — DEXAMETHASONE SODIUM PHOSPHATE 4 MG: 4 INJECTION, SOLUTION INTRAMUSCULAR; INTRAVENOUS at 12:10

## 2024-10-24 RX ADMIN — SODIUM CHLORIDE: 0.9 INJECTION, SOLUTION INTRAVENOUS at 01:10

## 2024-10-24 RX ADMIN — MUPIROCIN: 20 OINTMENT TOPICAL at 11:10

## 2024-10-24 NOTE — ANESTHESIA PROCEDURE NOTES
Intubation    Date/Time: 10/24/2024 12:27 PM    Performed by: Emil Fontaine CRNA  Authorized by: Gennaro Vo MD    Intubation:     Induction:  Intravenous    Intubated:  Postinduction    Mask Ventilation:  Easy mask    Attempts:  1    Attempted By:  CRNA    Difficult Airway Encountered?: No      Complications:  None    Airway Device:  Supraglottic airway/LMA    Airway Device Size:  4.0    Placement Verified By:  Capnometry    Complicating Factors:  None    Findings Post-Intubation:  BS equal bilateral and atraumatic/condition of teeth unchanged

## 2024-10-24 NOTE — TRANSFER OF CARE
Anesthesia Transfer of Care Note    Patient: Africa Wynne    Procedure(s) Performed: Procedure(s) (LRB):  HYSTEROSCOPY, WITH DILATION AND CURETTAGE OF UTERUS (N/A)    Patient location: PACU    Anesthesia Type: general    Transport from OR: Transported from OR on 6-10 L/min O2 by face mask with adequate spontaneous ventilation    Post pain: adequate analgesia    Post assessment: no apparent anesthetic complications and tolerated procedure well    Post vital signs: stable    Level of consciousness: awake and responds to stimulation    Nausea/Vomiting: no nausea/vomiting    Complications: none    Transfer of care protocol was followed      Last vitals: Visit Vitals  BP (!) 163/85 (BP Location: Right arm, Patient Position: Lying)   Pulse (!) 55   Temp 36.1 °C (97 °F)   Resp 16   LMP  (LMP Unknown)   SpO2 100%   Breastfeeding No

## 2024-10-24 NOTE — ANESTHESIA POSTPROCEDURE EVALUATION
Anesthesia Post Evaluation    Patient: Africa Wynne    Procedure(s) Performed: Procedure(s) (LRB):  HYSTEROSCOPY, WITH DILATION AND CURETTAGE OF UTERUS (N/A)    Final Anesthesia Type: general      Patient location during evaluation: PACU  Patient participation: Yes- Able to Participate  Level of consciousness: awake and alert  Post-procedure vital signs: reviewed and stable  Pain management: adequate  Airway patency: patent  RHONDA mitigation strategies: Extubation while patient is awake  PONV status at discharge: No PONV  Anesthetic complications: no      Cardiovascular status: hemodynamically stable  Respiratory status: unassisted  Hydration status: euvolemic  Follow-up not needed.              Vitals Value Taken Time   /76 10/24/24 1450   Temp 37 °C (98.6 °F) 10/24/24 1450   Pulse 60 10/24/24 1450   Resp 17 10/24/24 1450   SpO2 95 % 10/24/24 1450         Event Time   Out of Recovery 14:42:23         Pain/James Score: Pain Rating Prior to Med Admin: 5 (10/24/2024  1:28 PM)  James Score: 10 (10/24/2024  2:50 PM)

## 2024-10-26 VITALS
TEMPERATURE: 99 F | SYSTOLIC BLOOD PRESSURE: 123 MMHG | HEART RATE: 61 BPM | DIASTOLIC BLOOD PRESSURE: 78 MMHG | RESPIRATION RATE: 18 BRPM | OXYGEN SATURATION: 97 %

## 2024-10-28 LAB
FINAL PATHOLOGIC DIAGNOSIS: NORMAL
GROSS: NORMAL
Lab: NORMAL

## 2024-10-30 ENCOUNTER — HOSPITAL ENCOUNTER (OUTPATIENT)
Dept: PULMONOLOGY | Facility: HOSPITAL | Age: 64
Discharge: HOME | End: 2024-10-30
Attending: INTERNAL MEDICINE
Payer: MEDICARE

## 2024-10-30 DIAGNOSIS — R06.09 DYSPNEA ON EXERTION: ICD-10-CM

## 2024-10-30 LAB
FEF 25 75 LLN: 1.54
FEF 25 75 PRE REF: 78.8 %
FEF 25 75 REF: 2.94
FEV1 FVC LLN: 67
FEV1 FVC PRE REF: 100.8 %
FEV1 FVC REF: 79
FEV1 LLN: 1.63
FEV1 PRE REF: 104.9 %
FEV1 REF: 2.25
FVC LLN: 2.09
FVC PRE REF: 103.5 %
FVC REF: 2.85
PEF LLN: 3.73
PEF PRE REF: 106.8 %
PEF REF: 5.87
PRE FEF 25 75: 2.32 L/S (ref 1.54–4.34)
PRE FET 100: 7.2 SEC
PRE FEV1 FVC: 79.94 % (ref 67.42–89.47)
PRE FEV1: 2.36 L (ref 1.63–2.84)
PRE FVC: 2.95 L (ref 2.09–3.65)
PRE PEF: 6.26 L/S (ref 3.73–8)

## 2024-10-30 PROCEDURE — 94010 BREATHING CAPACITY TEST: CPT

## 2024-10-31 ENCOUNTER — OFFICE VISIT (OUTPATIENT)
Dept: OBSTETRICS AND GYNECOLOGY | Facility: CLINIC | Age: 64
End: 2024-10-31
Payer: MEDICARE

## 2024-10-31 VITALS
BODY MASS INDEX: 43.05 KG/M2 | DIASTOLIC BLOOD PRESSURE: 75 MMHG | SYSTOLIC BLOOD PRESSURE: 128 MMHG | HEIGHT: 66 IN | WEIGHT: 267.88 LBS

## 2024-10-31 DIAGNOSIS — R93.89 INCREASED ENDOMETRIAL STRIPE THICKNESS: Primary | ICD-10-CM

## 2024-10-31 DIAGNOSIS — R10.9 LEFT SIDED ABDOMINAL PAIN: ICD-10-CM

## 2024-10-31 DIAGNOSIS — N89.8 VAGINAL DISCHARGE: ICD-10-CM

## 2024-10-31 LAB
BILIRUB SERPL-MCNC: NEGATIVE MG/DL
BLOOD URINE, POC: NEGATIVE
CLARITY, POC UA: CLEAR
COLOR, POC UA: YELLOW
GLUCOSE UR QL STRIP: 250
KETONES UR QL STRIP: ABNORMAL
LEUKOCYTE ESTERASE URINE, POC: NEGATIVE
NITRITE, POC UA: NEGATIVE
PH, POC UA: 5
PROTEIN, POC: NEGATIVE
SPECIFIC GRAVITY, POC UA: 1.02
UROBILINOGEN, POC UA: NORMAL

## 2024-10-31 PROCEDURE — 99999 PR PBB SHADOW E&M-EST. PATIENT-LVL III: CPT | Mod: PBBFAC,,, | Performed by: OBSTETRICS & GYNECOLOGY

## 2024-10-31 PROCEDURE — 87086 URINE CULTURE/COLONY COUNT: CPT | Performed by: OBSTETRICS & GYNECOLOGY

## 2024-10-31 PROCEDURE — 0352U VAGINOSIS SCREEN BY DNA PROBE: CPT | Performed by: OBSTETRICS & GYNECOLOGY

## 2024-10-31 RX ORDER — DICLOFENAC SODIUM 75 MG/1
TABLET, DELAYED RELEASE ORAL
COMMUNITY
Start: 2024-10-10

## 2024-10-31 RX ORDER — FUROSEMIDE 40 MG/1
TABLET ORAL
COMMUNITY
Start: 2024-10-22

## 2024-10-31 RX ORDER — APIXABAN 5 MG/1
5 TABLET, FILM COATED ORAL 2 TIMES DAILY
COMMUNITY
Start: 2024-10-23

## 2024-11-01 ENCOUNTER — TELEPHONE (OUTPATIENT)
Dept: PULMONOLOGY | Facility: CLINIC | Age: 64
End: 2024-11-01
Payer: MEDICARE

## 2024-11-01 LAB
BACTERIA UR CULT: NORMAL
BACTERIA UR CULT: NORMAL

## 2024-11-02 ENCOUNTER — PATIENT MESSAGE (OUTPATIENT)
Dept: BARIATRICS | Facility: CLINIC | Age: 64
End: 2024-11-02
Payer: MEDICARE

## 2024-11-02 LAB
BACTERIAL VAGINOSIS DNA: NOT DETECTED
CANDIDA GLABRATA/KRUSEI: NOT DETECTED
CANDIDA RRNA VAG QL PROBE: NOT DETECTED
TRICHOMONAS VAGINALIS: NOT DETECTED

## 2024-11-12 ENCOUNTER — PATIENT MESSAGE (OUTPATIENT)
Dept: BARIATRICS | Facility: CLINIC | Age: 64
End: 2024-11-12
Payer: MEDICARE

## 2024-12-10 ENCOUNTER — PATIENT MESSAGE (OUTPATIENT)
Dept: BARIATRICS | Facility: CLINIC | Age: 64
End: 2024-12-10
Payer: MEDICARE

## 2024-12-17 NOTE — PROGRESS NOTES
Subjective:      Patient ID: Africa Wynne is a 64 y.o. female.    Chief Complaint: Hospital Follow Up and Shortness of Breath    Pt is a 65 yo AAF pmh HTN, HLD, SVT, obesity, DM2, liver fibrosis who presents for evaluation of dyspnea on exertion. Started after PE in 6/2024. Continues on long term anticoagulation with eliquis.     Smoking hx: never  Work hx: private sitting, nursing homes, cook  Exposure hx: none, dog, no birds  Inhaler use: none  PRN inhaler use: none  Hx of lung dz: none  Family hx of lung dz:     Shortness of breath for the past 2 weeks. Had chest pain and palpitations. Has been compliant with eliquis and had recent CTA chest that was negative for PE. Denies cough, lower extremity edema.   Chest pain in sharp in nature, lasts 2-3 minutes. Occurs with exertion and at rest. Shortness of breath starts first. Palpitations can occur without chest pain or shortness of breath. SVT ablation 1/2024, previously on atenolol.     Review of Systems   Constitutional:  Negative for weight loss, weight gain, activity change and weakness.   HENT:  Negative for postnasal drip, sinus pressure and congestion.    Respiratory:  Positive for dyspnea on extertion. Negative for cough, hemoptysis, sputum production, chest tightness, shortness of breath, wheezing, orthopnea, pleurisy and use of rescue inhaler.    Cardiovascular:  Positive for chest pain and palpitations. Negative for leg swelling.   Musculoskeletal:  Negative for arthralgias, gait problem and myalgias.   Gastrointestinal:  Negative for nausea, vomiting and acid reflux.   Neurological:  Negative for dizziness, syncope and light-headedness.   Psychiatric/Behavioral:  Negative for confusion and sleep disturbance. The patient is not nervous/anxious.      Objective:     Physical Exam   Constitutional: She is oriented to person, place, and time. She appears well-developed and well-nourished. She is obese.   HENT:   Head: Normocephalic.   Cardiovascular:  Normal rate, regular rhythm and normal heart sounds.   Pulmonary/Chest: Normal expansion, symmetric chest wall expansion and effort normal. No respiratory distress. She has no decreased breath sounds. She has no wheezes. She has no rhonchi. She has no rales.   Musculoskeletal:         General: No edema.   Neurological: She is alert and oriented to person, place, and time. Gait normal.   Skin: No cyanosis. Nails show no clubbing.   Psychiatric: She has a normal mood and affect. Her behavior is normal. Judgment and thought content normal.   Vitals reviewed.    Personal Diagnostic Review    CT of chest performed on 6/21/24 PE protocol revealed no parenchymal or interstitial abnormalities. Bilateral lobar PE in all lobes.    Echocardiogram: 8/6/24  Left Ventricle The left ventricle is normal in size. Normal wall thickness. Normal wall motion. There is normal systolic function. Biplane (2D) method of discs ejection fraction is 56%. There is normal diastolic function.   Right Ventricle Normal right ventricular cavity size. Systolic function is normal.   Left Atrium Normal left atrial size.   Right Atrium Normal right atrial size.   Aortic Valve Aortic valve peak velocity is 1.21 m/s. Mean gradient is 3 mmHg.   Mitral Valve The mean pressure gradient across the mitral valve is 2 mmHg at a heart rate of  bpm.   IVC/SVC Normal venous pressure at 3 mmHg.   Ascending Aorta Ascending aorta is normal measuring 2.50 cm.   Pericardium and Other Findings There is no pericardial effusion.   Pulmonary Artery The estimated pulmonary artery systolic pressure is 34 mmHg.     Pulmonary function tests:   11/10/24  FEV1: 2.36L  (104.9% predicted),   FVC:  2.09L (103.5 % predicted),   FEV1/FVC:  80,   TLC: unable to perform  DLCO: unable to perform        10/31/2024     9:59 AM 10/24/2024     3:20 PM 10/24/2024     2:50 PM 10/24/2024     2:30 PM 10/24/2024     2:15 PM 10/24/2024     2:00 PM 10/24/2024     1:45 PM   Pulmonary Function Tests  "  SpO2  97 % 95 % 96 % 95 % 96 % 97 %   Height 5' 6" (1.676 m)         Weight 121.5 kg (267 lb 13.7 oz)         BMI (Calculated) 43.3           Assessment:     1. History of pulmonary embolism    2. Chronic anticoagulation    3. Dyspnea on exertion    4. Morbid obesity with BMI of 40.0-44.9, adult         Outpatient Encounter Medications as of 12/18/2024   Medication Sig Dispense Refill    ACCU-CHEK GRZEGORZ PLUS TEST STRP Strp TEST BLOOD SUGAR ONCCE OR TWICE DAILY      ACCU-CHEK GUIDE ME GLUCOSE MTR Misc use as directed      ACCU-CHEK SOFTCLIX LANCETS Misc       atenoloL (TENORMIN) 100 MG tablet Take 100 mg by mouth once daily.      atorvastatin (LIPITOR) 20 MG tablet Take 1 tablet (20 mg total) by mouth once daily. 90 tablet 3    chlorthalidone (HYGROTEN) 25 MG Tab Take 25 mg by mouth.      clotrimazole (LOTRIMIN) 1 % cream SMARTSIG:Topical Morning-Evening      colchicine (COLCRYS) 0.6 mg tablet Take 1 tablet (0.6 mg total) by mouth 2 (two) times daily. 30 tablet 0    diclofenac (VOLTAREN) 75 MG EC tablet TAKE 1 TABLET TWICE A DAY BY ORAL ROUTE AS NEEDED FOR 30 DAYS, FOR PAIN.      dicyclomine (BENTYL) 10 MG capsule Take 10 mg by mouth 4 (four) times daily before meals and nightly.      ELIQUIS 5 mg Tab Take 5 mg by mouth 2 (two) times daily.      enoxaparin (LOVENOX) 120 mg/0.8 mL Syrg Inject 0.8 mLs (120 mg total) into the skin 2 (two) times daily. 20 each 1    furosemide (LASIX) 40 MG tablet TAKE 1 TABLET ORALLY ONCE A DAY FOR 3 DAYS, THEN EVERY OTHER DAY AS NEEDED FOR SHORTNESS OF BREATH OR DYSPNEA ON EXERTION.      glipiZIDE (GLUCOTROL) 5 MG tablet       HYDROcodone-acetaminophen (NORCO) 5-325 mg per tablet Take 1 tablet by mouth every 6 (six) hours as needed for Pain. 5 tablet 0    ibuprofen (ADVIL,MOTRIN) 600 MG tablet Take 1 tablet (600 mg total) by mouth 3 (three) times daily. 30 tablet 0    JANUVIA 100 mg Tab TAKE 1 TABLET EVERY DAY BY ORAL ROUTE FOR 90 DAYS.      JARDIANCE 10 mg tablet Take 10 mg by mouth. "      LIDOcaine (LIDODERM) 5 % Place 1 patch onto the skin once daily. Remove & Discard patch within 12 hours or as directed by MD 15 patch 0    linaCLOtide (LINZESS) 72 mcg Cap capsule Take 1 capsule by mouth once daily. As needed      metFORMIN (GLUCOPHAGE) 500 MG tablet Take 500 mg by mouth daily with breakfast.      nortriptyline (PAMELOR) 25 MG capsule Take 25 mg by mouth every evening.      pregabalin (LYRICA) 200 MG Cap TAKE 1 CAPSULE(S) BY MOUTH TWO TIMES DAILY      [DISCONTINUED] amitriptyline (ELAVIL) 25 MG tablet Take 25 mg by mouth nightly as needed.       No facility-administered encounter medications on file as of 12/18/2024.     No orders of the defined types were placed in this encounter.      Plan:     History of pulmonary embolism  On chronic eliquis. Most recent CTA 7/2024, negative for PE. Suspect this is less likely cause of shortness of breath, but will move CTA closer than 1/2024 to rule out PE.     Chronic anticoagulation  Per Hematology.     Dyspnea on exertion  Suspect that this is unlikely due to pulmonary issues. Recent CTA was negative for parenchymal or interstitial findings. On chronic eliquis and rarely misses doses so unlikely recurrent PE. Will follow CTA. Suspect this is more due to morbid obesity with great need for weight loss.     Morbid obesity with BMI of 40.0-44.9, adult  Most likely cause of shortness of breath. Spirometry normal without evidence of obstruction. Unable to perform lung volumes or diffusion capacity. Complicates all aspects of care.     Follow up in 3 months.     Zev Vora MD  [facility]

## 2024-12-18 ENCOUNTER — OFFICE VISIT (OUTPATIENT)
Dept: RHEUMATOLOGY | Facility: CLINIC | Age: 64
End: 2024-12-18
Payer: MEDICARE

## 2024-12-18 ENCOUNTER — OFFICE VISIT (OUTPATIENT)
Dept: PULMONOLOGY | Facility: CLINIC | Age: 64
End: 2024-12-18
Payer: MEDICARE

## 2024-12-18 ENCOUNTER — TELEPHONE (OUTPATIENT)
Dept: BARIATRICS | Facility: CLINIC | Age: 64
End: 2024-12-18
Payer: MEDICARE

## 2024-12-18 VITALS
OXYGEN SATURATION: 98 % | DIASTOLIC BLOOD PRESSURE: 82 MMHG | HEART RATE: 70 BPM | BODY MASS INDEX: 41.56 KG/M2 | SYSTOLIC BLOOD PRESSURE: 128 MMHG | HEIGHT: 66 IN | WEIGHT: 258.63 LBS

## 2024-12-18 VITALS
WEIGHT: 257.5 LBS | BODY MASS INDEX: 41.38 KG/M2 | SYSTOLIC BLOOD PRESSURE: 121 MMHG | HEIGHT: 66 IN | HEART RATE: 68 BPM | DIASTOLIC BLOOD PRESSURE: 81 MMHG

## 2024-12-18 DIAGNOSIS — R76.8 POSITIVE ANA (ANTINUCLEAR ANTIBODY): Primary | ICD-10-CM

## 2024-12-18 DIAGNOSIS — M79.7 FIBROMYALGIA: ICD-10-CM

## 2024-12-18 DIAGNOSIS — E66.01 MORBID OBESITY: ICD-10-CM

## 2024-12-18 DIAGNOSIS — M19.90 OSTEOARTHRITIS, UNSPECIFIED OSTEOARTHRITIS TYPE, UNSPECIFIED SITE: ICD-10-CM

## 2024-12-18 DIAGNOSIS — Z79.01 CHRONIC ANTICOAGULATION: ICD-10-CM

## 2024-12-18 DIAGNOSIS — R76.8 ELEVATED RHEUMATOID FACTOR: ICD-10-CM

## 2024-12-18 DIAGNOSIS — R06.09 DYSPNEA ON EXERTION: ICD-10-CM

## 2024-12-18 DIAGNOSIS — Z86.711 HISTORY OF PULMONARY EMBOLISM: Primary | ICD-10-CM

## 2024-12-18 DIAGNOSIS — E66.01 MORBID OBESITY WITH BMI OF 40.0-44.9, ADULT: ICD-10-CM

## 2024-12-18 PROCEDURE — 99999 PR PBB SHADOW E&M-EST. PATIENT-LVL III: CPT | Mod: PBBFAC,,, | Performed by: STUDENT IN AN ORGANIZED HEALTH CARE EDUCATION/TRAINING PROGRAM

## 2024-12-18 PROCEDURE — 99999 PR PBB SHADOW E&M-EST. PATIENT-LVL IV: CPT | Mod: PBBFAC,,, | Performed by: INTERNAL MEDICINE

## 2024-12-18 PROCEDURE — 3074F SYST BP LT 130 MM HG: CPT | Mod: CPTII,S$GLB,, | Performed by: STUDENT IN AN ORGANIZED HEALTH CARE EDUCATION/TRAINING PROGRAM

## 2024-12-18 PROCEDURE — 1159F MED LIST DOCD IN RCRD: CPT | Mod: CPTII,S$GLB,, | Performed by: STUDENT IN AN ORGANIZED HEALTH CARE EDUCATION/TRAINING PROGRAM

## 2024-12-18 PROCEDURE — 3008F BODY MASS INDEX DOCD: CPT | Mod: CPTII,S$GLB,, | Performed by: STUDENT IN AN ORGANIZED HEALTH CARE EDUCATION/TRAINING PROGRAM

## 2024-12-18 PROCEDURE — 99214 OFFICE O/P EST MOD 30 MIN: CPT | Mod: S$GLB,,, | Performed by: STUDENT IN AN ORGANIZED HEALTH CARE EDUCATION/TRAINING PROGRAM

## 2024-12-18 PROCEDURE — 3079F DIAST BP 80-89 MM HG: CPT | Mod: CPTII,S$GLB,, | Performed by: STUDENT IN AN ORGANIZED HEALTH CARE EDUCATION/TRAINING PROGRAM

## 2024-12-18 PROCEDURE — 3046F HEMOGLOBIN A1C LEVEL >9.0%: CPT | Mod: CPTII,S$GLB,, | Performed by: STUDENT IN AN ORGANIZED HEALTH CARE EDUCATION/TRAINING PROGRAM

## 2024-12-18 RX ORDER — OXYCODONE AND ACETAMINOPHEN 10; 325 MG/1; MG/1
1 TABLET ORAL EVERY 8 HOURS PRN
COMMUNITY
Start: 2024-12-13

## 2024-12-18 RX ORDER — PENICILLIN V POTASSIUM 500 MG/1
500 TABLET, FILM COATED ORAL EVERY 6 HOURS
COMMUNITY
Start: 2024-12-09

## 2024-12-18 RX ORDER — TIRZEPATIDE 5 MG/.5ML
INJECTION, SOLUTION SUBCUTANEOUS
COMMUNITY
Start: 2024-12-05

## 2024-12-18 NOTE — ASSESSMENT & PLAN NOTE
Suspect that this is unlikely due to pulmonary issues. Recent CTA was negative for parenchymal or interstitial findings. On chronic eliquis and rarely misses doses so unlikely recurrent PE. Will follow CTA. Suspect this is more due to morbid obesity with great need for weight loss.

## 2024-12-18 NOTE — ASSESSMENT & PLAN NOTE
Most likely cause of shortness of breath. Spirometry normal without evidence of obstruction. Unable to perform lung volumes or diffusion capacity. Complicates all aspects of care.

## 2024-12-18 NOTE — PROGRESS NOTES
RHEUMATOLOGY OUTPATIENT CLINIC NOTE    12/18/2024    Attending Rheumatologist: Rubia Hester  Primary Care Provider: Efren Small MD   Physician Requesting Consultation: No referring provider defined for this encounter.  Chief Complaint/Reason For Consultation:  Fibromyalgia      Subjective:       HPI  Africa Wynne is a 64 y.o. Black or  female with hypertension, diabetes, SVT s/p ablation, DJD of the lumbar spine, history of hepatitis C treated, Knee OA who comes for evaluation of joint pain.     Interim history  -last visit in 9/2024  -did left knee CSI during last visit.   -following with hem/onc after PE and persistent leukocytosis which now has improved.  Plan is for her to continue anticoagulation with Eliquis for now. PFTs were normal. She was referred to pulm and will see them today   -saw cardiology - no concerns for pulm hypertension.   -left knee CSI only lasted for 2 weeks. She had right knee replacement by Dr. Matta a while ago. She has OA in the left knee too. She had left knee CSI in the past, last one on 2022  -denies any other complaints.  No rashes, no skin thickening, no Raynaud's, no muscle weakness    Disease history     In 2016, she had complicated hernia repair, required prolonged hospital stay. Since then she reports diffuse pain in muscles and joint. No particular area that hurts more. Has not noted swelling over the years. Reports morning stiffness for a couple of minutes. She does not sleep well. Wakes up often to urinate.   Takes lyrica 200 mg QHS and feels that it does not help. Tried gabapentin and cymbalta which did not help.  She also takes nortryptiline.     She had right TKR but still has issues with that knee. Was told she needs replacement in the left one but she does not want to do it.   She also has significant OA in her lumbar spine. MRI of the lumbar spine from 6/2023 showed Lumbar spondylosis, contributing to mild spinal canal  stenosis at L4-5 and moderate neural foraminal narrowing at L5-S1. Prominent L4-5 facet joint arthropathy with grade I anterolisthesis. Advanced L5-S1 degenerative disc disease.    She reports shortness of breath for about 2 years, has to stop a lot to catch her breath. No cough, no pedal edema.   Follows with cardiologist  Cardiologist. Had recent SVT ablation     No skin rashes, sclerodactyly, dysphagia, sicca symptoms. Reports raynauds in fingertips with cold exposure, no gangrene.     Follows with bariatrics for possible sleeve gastrectomy. She has follow up with them on 2/21      Labs  12/2023  Vit D 13  CBC unremarkable  Kidney and liver function normal  TSH and free T4 normal    History of hepatitis C that was treated     Labs  1/2024  RF 26.3 high  PJ positive but no titer or pattern.   DSDNA, RNP, SM, SCL-70, SSA, SSB, Angle-1, chromatin, centromere negative  C3 203 (high)  C4 29 normal.   Uric acid 5.8    2016   PJ >1:2560 centromere pattern  Negative DSDNA, SSA, SSB, SM/RNP    Imaging    4/2023 Echocardiogram:   The left ventricle is normal in size with normal systolic function.  The estimated ejection fraction is 65%.  Normal left ventricular diastolic function.  Normal right ventricular size with normal right ventricular systolic function.  Mild tricuspid regurgitation.  Normal central venous pressure (3 mmHg).  The estimated PA systolic pressure is 33 mmHg.        Chronic comorbid conditions affecting medical decision making today:  Past Medical History:   Diagnosis Date    Abnormal thyroid blood test 11/30/2016    Anxiety and depression 08/21/2014    Chronic hepatitis C without hepatic coma 07/10/2013    Dehiscence of operative wound     Diabetes mellitus     Encounter for blood transfusion     Enterocutaneous fistula     GERD (gastroesophageal reflux disease)     Hepatitis C     Hernia of unspecified site of abdominal cavity without mention of obstruction or gangrene     Hypertension     Liver  fibrosis 12/06/2018    Lupus     Pulmonary embolism     Sleep apnea     10/2024 not wearing cpap bc mask is broken    Wears partial dentures      Past Surgical History:   Procedure Laterality Date    ABLATION, SVT, ACCESSORY PATHWAY N/A 2/1/2024    Procedure: Ablation, SVT, Accessory Pathway;  Surgeon: Davi García MD;  Location: [facility] EP LAB;  Service: Cardiology;  Laterality: N/A;  SVT, RFA, Carto, MAC, KS, 3 Prep    APPENDECTOMY      COLONOSCOPY      COLONOSCOPY N/A 12/28/2018    Procedure: COLONOSCOPY;  Surgeon: Kay Nicole MD;  Location: [facility] ENDO;  Service: Endoscopy;  Laterality: N/A;    COLONOSCOPY N/A 10/9/2024    Procedure: COLONOSCOPY;  Surgeon: Davi Vo MD;  Location: [facility] ENDO ([facility] FLR);  Service: Endoscopy;  Laterality: N/A;  referred by Dr. Efren Small, pt is on eliquis, orders received from Dr.Ifitkar Ryan (see documentation in visit note with cardio on july 25. ok to hold eliquis x3 days and start lovenox on days eliquis is on hold, pt scheduled on 2nd floor hx bilat pe's, elevated bmi/49 and os    HYSTEROSCOPY WITH DILATION AND CURETTAGE OF UTERUS N/A 10/24/2024    Procedure: HYSTEROSCOPY, WITH DILATION AND CURETTAGE OF UTERUS;  Surgeon: Tania Calvin MD;  Location: [facility] [place] OR;  Service: OB/GYN;  Laterality: N/A;    INGUINAL HERNIA REPAIR Right     KNEE SURGERY      LEFT HEART CATHETERIZATION Left 01/13/2021    Procedure: Left heart cath;  Surgeon: Antonio Doty III, MD;  Location: [facility] CATH LAB;  Service: Cardiology;  Laterality: Left;    LIVER BIOPSY      SMALL INTESTINE SURGERY      TOTAL KNEE ARTHROPLASTY Right 06/13/2022    Procedure: ARTHROPLASTY, KNEE, TOTAL;  Surgeon: Avel Matta MD;  Location: [facility] OR;  Service: Orthopedics;  Laterality: Right;    TUBAL LIGATION      UPPER GASTROINTESTINAL ENDOSCOPY       Family History   Problem Relation Name Age of Onset    Diabetes Mother      Hypertension Mother      Kidney failure Father      Lung cancer Paternal  Grandfather      Colon cancer Neg Hx       Social History     Substance and Sexual Activity   Alcohol Use Yes    Comment: beer occ     Social History     Tobacco Use   Smoking Status Never    Passive exposure: Never   Smokeless Tobacco Never     Social History     Substance and Sexual Activity   Drug Use No       Current Outpatient Medications:     atenoloL (TENORMIN) 100 MG tablet, Take 100 mg by mouth once daily., Disp: , Rfl:     atorvastatin (LIPITOR) 20 MG tablet, Take 1 tablet (20 mg total) by mouth once daily., Disp: 90 tablet, Rfl: 3    clotrimazole (LOTRIMIN) 1 % cream, SMARTSIG:Topical Morning-Evening, Disp: , Rfl:     colchicine (COLCRYS) 0.6 mg tablet, Take 1 tablet (0.6 mg total) by mouth 2 (two) times daily., Disp: 30 tablet, Rfl: 0    diclofenac (VOLTAREN) 75 MG EC tablet, TAKE 1 TABLET TWICE A DAY BY ORAL ROUTE AS NEEDED FOR 30 DAYS, FOR PAIN., Disp: , Rfl:     ELIQUIS 5 mg Tab, Take 5 mg by mouth 2 (two) times daily., Disp: , Rfl:     enoxaparin (LOVENOX) 120 mg/0.8 mL Syrg, Inject 0.8 mLs (120 mg total) into the skin 2 (two) times daily. (Patient taking differently: Inject 120 mg into the skin as needed. For procedures when she stops taking eliquis she does this), Disp: 20 each, Rfl: 1    furosemide (LASIX) 40 MG tablet, TAKE 1 TABLET ORALLY ONCE A DAY FOR 3 DAYS, THEN EVERY OTHER DAY AS NEEDED FOR SHORTNESS OF BREATH OR DYSPNEA ON EXERTION., Disp: , Rfl:     HYDROcodone-acetaminophen (NORCO) 5-325 mg per tablet, Take 1 tablet by mouth every 6 (six) hours as needed for Pain., Disp: 5 tablet, Rfl: 0    ibuprofen (ADVIL,MOTRIN) 600 MG tablet, Take 1 tablet (600 mg total) by mouth 3 (three) times daily., Disp: 30 tablet, Rfl: 0    JANUVIA 100 mg Tab, TAKE 1 TABLET EVERY DAY BY ORAL ROUTE FOR 90 DAYS., Disp: , Rfl:     JARDIANCE 10 mg tablet, Take 10 mg by mouth., Disp: , Rfl:     LIDOcaine (LIDODERM) 5 %, Place 1 patch onto the skin once daily. Remove & Discard patch within 12 hours or as directed by  MD (Patient taking differently: Place 1 patch onto the skin as needed. Remove & Discard patch within 12 hours or as directed by MD), Disp: 15 patch, Rfl: 0    linaCLOtide (LINZESS) 72 mcg Cap capsule, Take 1 capsule by mouth once daily. As needed, Disp: , Rfl:     metFORMIN (GLUCOPHAGE) 500 MG tablet, Take 500 mg by mouth daily with breakfast., Disp: , Rfl:     MOUNJARO 5 mg/0.5 mL PnIj, Inject into the skin every 7 days., Disp: , Rfl:     nortriptyline (PAMELOR) 25 MG capsule, Take 25 mg by mouth every evening., Disp: , Rfl:     oxyCODONE-acetaminophen (PERCOCET)  mg per tablet, Take 1 tablet by mouth every 8 (eight) hours as needed., Disp: , Rfl:     penicillin v potassium (VEETID) 500 MG tablet, Take 500 mg by mouth every 6 (six) hours., Disp: , Rfl:     pregabalin (LYRICA) 200 MG Cap, TAKE 1 CAPSULE(S) BY MOUTH TWO TIMES DAILY, Disp: , Rfl:      Objective:         Vitals:    12/18/24 1118   BP: 121/81   Pulse: 68           Physical Exam   Constitutional: She is oriented to person, place, and time. She appears well-developed.   HENT:   Head: Normocephalic.   Mouth/Throat: Mucous membranes are moist.   Cardiovascular: Normal rate and normal heart sounds.   Pulmonary/Chest: Effort normal and breath sounds normal.   Musculoskeletal:      Cervical back: Neck supple.      Comments: Cspine FROM no tenderness  Tspine FROM no tenderness  Lspine FROM, paraspinal tenderness  Shoulders: FROM; no synovitis;  Elbows: FROM; no synovitis; no tophi or nodules  Wrists: FROM; no synovitis;    MCPs: FROM; no synovitis;   PIPs:FROM; no synovitis;   DIPs: FROM; no synovitis;   HIPS: FROM  Knees: + crepitus, FROM; no synovitis; no instability  Ankles: FROM: no synovitis   Toes: no tenderness on palpation.      Positive tender points in forearms, arms, paraspinal area, thighs   Lymphadenopathy:     She has no cervical adenopathy.   Neurological: She is alert and oriented to person, place, and time.   Skin: No rash noted.   No  "skin rashes noted.   Normal nailfold capillaroscopy   Vitals reviewed.      Reviewed old and all outside pertinent medical records available.    All lab results personally reviewed and interpreted by me.  Lab Results   Component Value Date    WBC 10.28 08/09/2024    HGB 12.6 08/09/2024    HCT 38.5 08/09/2024    MCV 91 08/09/2024    MCH 29.8 08/09/2024    MCHC 32.7 08/09/2024    RDW 13.0 08/09/2024     08/09/2024    MPV 10.2 08/09/2024    NEUTROABS 7.85 07/13/2024    LYMPHOPCT 29.10 07/13/2024    PLTEST Increased (A) 04/01/2017       Lab Results   Component Value Date     10/29/2024    K 4.2 10/29/2024     10/29/2024    CO2 25 10/29/2024     (H) 10/29/2024    BUN 15 10/29/2024    CALCIUM 9.8 10/29/2024    PROT 6.9 08/09/2024    ALBUMIN 3.5 08/09/2024    BILITOT 0.3 08/09/2024    AST 13 08/09/2024    ALKPHOS 44 (L) 08/09/2024    ALT 13 08/09/2024       Lab Results   Component Value Date    COLORU Yellow 10/31/2024    APPEARANCEUA Clear 08/09/2022    SPECGRAV 1.020 10/31/2024    PHUR 5 10/31/2024    PROTEINUA Negative 08/09/2022    KETONESU Negaive 10/31/2024    LEUKOCYTESUR Negative 08/09/2022    NITRITE Negative 10/31/2024    UROBILINOGEN Normal 10/31/2024       Lab Results   Component Value Date    CRP 3.7 02/19/2024       Lab Results   Component Value Date    RF 19.0 (H) 02/19/2024    SEDRATE 5 02/19/2024    CCPANTIBODIE 0.5 02/19/2024       No components found for: "25OHVITDTOT", "51OHSHOQ9", "78VNGEKD5", "METHODNOTE"    No results found for: "URICACID"    No results found for: "QUANTIFERON", "HEPBCOREIGG", "HEPBSAB", "HEPBSAG", "HEPCAB"    Imaging:  All imaging reviewed and independently interpreted by me.         ASSESSMENT / PLAN:     Africa Wynne is a 64 y.o. Black or  female with:    1. Positive PJ (antinuclear antibody)  -PJ >1>2560 centromere pattern with negative subserologies. High centromere antibody in 1/2024. Negative SCL-70   -No s/s of connective " tissue disease such as lupus, sjogren's, scleroderma at this moment but will need to monitor closely  -normal complements, ESR and CRP.     2. Abnormal echo  -echocardiogram from 6/2024 showed mild pulmonary hypertension.  But then repeat echocardiogram in August showed improvement in the pulmonary artery systolic pressure  -saw cardiology. No concerns for pulm hypertension.     3. Fibromyalgia  -She has chronic widespread pain after prolonged stay in the hospital. This is her biggest concern  -has tried and failed gabapentin, cymbalta  -currently on notryptiline   -continue lyrica to 200 mg BID  -normal CK level  -consider other therapeutic options in the future     2. Osteoarthritis of multiple sites- active  -She has OA in multiple areas such as knees and lumbar spine  -she has been dealing with worsening bilateral knee pain. Hold off on left CSI as it only lasted 2 weeks. .  -Discussed with patient that she should follow-up with Dr. Matta for right knee as she had TKR and I would not be able to inject the right knee.   -Weight loss would help her tremendously.     4. Elevated rheumatoid factor  -RF 19 -borderline with negative CCP.   -no clinical symptoms suggestive of rheumatoid arthritis  -reassurance     5. Vitamin D deficiency  -level in 12/2023 was 13 then in 2/2024 was 17.   -change to cholecalciferol 22217 IU weekly   -recheck level in the future    6. Morbid Obesity  - would benefit from decreasing at least 10% of body weight.  - recommended goal of losing 1 lb per week.  - consider nutritionist evaluation.  - would consider screening for RHONDA per PMD.    Follow up in about 3 months (around 3/18/2025).    Method of contact with patient concerns: [facility]    Disclaimer:  This note is prepared using voice recognition software and as such is likely to have errors and has not been proof read. Please contact me for questions.     Time spent: 25 minutes in face to face discussion concerning  diagnosis, prognosis, review of lab and test results, benefits of treatment as well as management of disease, counseling of patient and coordination of care between various health care providers.  Greater than half the time spent was used for coordination of care and counseling of patient.    Rubia Hester M.D.  Rheumatology  [facility]

## 2024-12-18 NOTE — ASSESSMENT & PLAN NOTE
On chronic eliquis. Most recent CTA 7/2024, negative for PE. Suspect this is less likely cause of shortness of breath, but will move CTA closer than 1/2024 to rule out PE.

## 2025-01-02 NOTE — PROGRESS NOTES
PATIENT: Africa Wynne  MRN: [id number]  DATE: 2025    Diagnosis:   1. History of pulmonary embolism    2. Prothrombin gene mutation    3. Morbid obesity    4. Chronic abdominal pain      Chief Complaint: history of pulmonary embolism    Oncologic History:      Oncologic History     Oncologic Treatment     Pathology       Subjective:    History of Present Illness: Ms. Wynne is a 64 y.o. female who presents for evaluation and management of history of pulmonary embolism and leukocytosis. She had previously seen nurse practitioner Kaleigh Florence and Dr. Tabor.    - Pt with intermittent elevated wbc since , first starting with long term hospitalizations in  (2016 surgical wound infection s/p hernia repair) and  (2017 enterocolic fistula, sepsis), intermittent since , again peaking 2024 during hospitalization (bilat PE) and improved to 12.8 K/uL on 24.   - Beta 2 glycoprotein antibodies, cardiolipin antibody negative on 24, DRVVT negative 24     Family history  Mother  age 72yrs from brain aneurysm complications, diabetes.  Father  age 74yrs uncertain actual death cause.  No family history of anemia, cancers.   Maternal cousin, in his 30s, has blood clots with multiple medical problems, heart problems, on dialysis.     Interval history:  - she presents for a follow-up appointment for her history of pulmonary embolism.  - she underwent CTA chest on 24.  - today, she endorses fatigue, dyspnea upon exertion, left-sided abdominal pain (s/p hernia repair). She would like a referral to gastorenterology.        Past medical, surgical, family, and social histories have been reviewed and updated below.    Past Medical History:   Past Medical History:   Diagnosis Date    Abnormal thyroid blood test 2016    Anxiety and depression 2014    Chronic hepatitis C without hepatic coma 07/10/2013    Dehiscence of operative wound     Diabetes mellitus     Encounter  for blood transfusion     Enterocutaneous fistula     GERD (gastroesophageal reflux disease)     Hepatitis C     Hernia of unspecified site of abdominal cavity without mention of obstruction or gangrene     Hypertension     Liver fibrosis 12/06/2018    Lupus     Pulmonary embolism     Sleep apnea     10/2024 not wearing cpap bc mask is broken    Wears partial dentures        Past Surgical History:   Past Surgical History:   Procedure Laterality Date    ABLATION, SVT, ACCESSORY PATHWAY N/A 2/1/2024    Procedure: Ablation, SVT, Accessory Pathway;  Surgeon: Davi García MD;  Location: [facility] EP LAB;  Service: Cardiology;  Laterality: N/A;  SVT, RFA, Carto, MAC, IL, 3 Prep    APPENDECTOMY      COLONOSCOPY      COLONOSCOPY N/A 12/28/2018    Procedure: COLONOSCOPY;  Surgeon: Kay Nicole MD;  Location: [facility] ENDO;  Service: Endoscopy;  Laterality: N/A;    COLONOSCOPY N/A 10/9/2024    Procedure: COLONOSCOPY;  Surgeon: Davi Vo MD;  Location: [facility] ENDO ([address] [place] [address]);  Service: Endoscopy;  Laterality: N/A;  referred by Dr. Efren Small, pt is on eliquis, orders received from Dr.Ifitkar Ryan (see documentation in visit note with cardio on july 25. ok to hold eliquis x3 days and start lovenox on days eliquis is on hold, pt scheduled on 2nd floor hx bilat pe's, elevated bmi/49 and os    HYSTEROSCOPY WITH DILATION AND CURETTAGE OF UTERUS N/A 10/24/2024    Procedure: HYSTEROSCOPY, WITH DILATION AND CURETTAGE OF UTERUS;  Surgeon: Tania Calvin MD;  Location: [facility] OR;  Service: OB/GYN;  Laterality: N/A;    INGUINAL HERNIA REPAIR Right     KNEE SURGERY      LEFT HEART CATHETERIZATION Left 01/13/2021    Procedure: Left heart cath;  Surgeon: Antonio oDty III, MD;  Location: [facility] CATH LAB;  Service: Cardiology;  Laterality: Left;    LIVER BIOPSY      SMALL INTESTINE SURGERY      TOTAL KNEE ARTHROPLASTY Right 06/13/2022    Procedure: ARTHROPLASTY, KNEE, TOTAL;  Surgeon: Avel Matta MD;  Location: [facility] OR;   Service: Orthopedics;  Laterality: Right;    TUBAL LIGATION      UPPER GASTROINTESTINAL ENDOSCOPY         Family History:   Family History   Problem Relation Name Age of Onset    Diabetes Mother      Hypertension Mother      Kidney failure Father      Lung cancer Paternal Grandfather      Colon cancer Neg Hx         Social History:  reports that she has never smoked. She has never been exposed to tobacco smoke. She has never used smokeless tobacco. She reports current alcohol use. She reports that she does not use drugs.    Allergies:  Review of patient's allergies indicates:  No Known Allergies    Medications:  Current Outpatient Medications   Medication Sig Dispense Refill    atenoloL (TENORMIN) 100 MG tablet Take 100 mg by mouth once daily.      atorvastatin (LIPITOR) 20 MG tablet Take 1 tablet (20 mg total) by mouth once daily. 90 tablet 3    clotrimazole (LOTRIMIN) 1 % cream SMARTSIG:Topical Morning-Evening (Patient not taking: Reported on 12/18/2024)      colchicine (COLCRYS) 0.6 mg tablet Take 1 tablet (0.6 mg total) by mouth 2 (two) times daily. (Patient not taking: Reported on 12/18/2024) 30 tablet 0    diclofenac (VOLTAREN) 75 MG EC tablet TAKE 1 TABLET TWICE A DAY BY ORAL ROUTE AS NEEDED FOR 30 DAYS, FOR PAIN. (Patient not taking: Reported on 12/18/2024)      ELIQUIS 5 mg Tab Take 5 mg by mouth 2 (two) times daily.      enoxaparin (LOVENOX) 120 mg/0.8 mL Syrg Inject 0.8 mLs (120 mg total) into the skin 2 (two) times daily. (Patient not taking: Reported on 12/18/2024) 20 each 1    furosemide (LASIX) 40 MG tablet TAKE 1 TABLET ORALLY ONCE A DAY FOR 3 DAYS, THEN EVERY OTHER DAY AS NEEDED FOR SHORTNESS OF BREATH OR DYSPNEA ON EXERTION.      HYDROcodone-acetaminophen (NORCO) 5-325 mg per tablet Take 1 tablet by mouth every 6 (six) hours as needed for Pain. 5 tablet 0    ibuprofen (ADVIL,MOTRIN) 600 MG tablet Take 1 tablet (600 mg total) by mouth 3 (three) times daily. (Patient not taking: Reported on  12/18/2024) 30 tablet 0    JANUVIA 100 mg Tab TAKE 1 TABLET EVERY DAY BY ORAL ROUTE FOR 90 DAYS.      JARDIANCE 10 mg tablet Take 10 mg by mouth.      LIDOcaine (LIDODERM) 5 % Place 1 patch onto the skin once daily. Remove & Discard patch within 12 hours or as directed by MD (Patient not taking: Reported on 12/18/2024) 15 patch 0    linaCLOtide (LINZESS) 72 mcg Cap capsule Take 1 capsule by mouth once daily. As needed      metFORMIN (GLUCOPHAGE) 500 MG tablet Take 500 mg by mouth daily with breakfast.      MOUNJARO 5 mg/0.5 mL PnIj Inject into the skin every 7 days.      nortriptyline (PAMELOR) 25 MG capsule Take 25 mg by mouth every evening.      oxyCODONE-acetaminophen (PERCOCET)  mg per tablet Take 1 tablet by mouth every 8 (eight) hours as needed.      penicillin v potassium (VEETID) 500 MG tablet Take 500 mg by mouth every 6 (six) hours.      pregabalin (LYRICA) 200 MG Cap TAKE 1 CAPSULE(S) BY MOUTH TWO TIMES DAILY       No current facility-administered medications for this visit.       Review of Systems   Constitutional:  Positive for fatigue. Negative for appetite change and unexpected weight change.   HENT:  Negative for mouth sores and sore throat.    Eyes:  Negative for visual disturbance.   Respiratory:  Negative for cough and shortness of breath.    Cardiovascular:  Negative for chest pain.   Gastrointestinal:  Positive for abdominal pain. Negative for constipation, diarrhea, nausea and vomiting.   Genitourinary:  Positive for frequency. Negative for dysuria.   Musculoskeletal:  Negative for back pain.   Skin:  Negative for rash.   Neurological:  Negative for headaches.   Hematological:  Negative for adenopathy.   Psychiatric/Behavioral:  The patient is not nervous/anxious.        ECOG Performance Status:   ECOG SCORE    1 - Restricted in strenuous activity-ambulatory and able to carry out work of a light nature         Objective:      Vitals:   Vitals:    01/06/25 1422   BP: (!) 163/96   BP  Location: Left arm   Patient Position: Sitting   Pulse: 79   Resp: 18   SpO2: 98%   Weight: 121 kg (266 lb 12.1 oz)     BMI: Body mass index is 43.06 kg/m².    Physical Exam  Vitals and nursing note reviewed.   Constitutional:       Appearance: She is well-developed.   HENT:      Head: Normocephalic and atraumatic.   Eyes:      Pupils: Pupils are equal, round, and reactive to light.   Cardiovascular:      Rate and Rhythm: Normal rate and regular rhythm.   Pulmonary:      Effort: Pulmonary effort is normal.      Breath sounds: Normal breath sounds.   Abdominal:      General: Bowel sounds are normal.      Palpations: Abdomen is soft.   Musculoskeletal:         General: Normal range of motion.      Cervical back: Normal range of motion and neck supple.   Skin:     General: Skin is warm and dry.   Neurological:      Mental Status: She is alert and oriented to person, place, and time.   Psychiatric:         Behavior: Behavior normal.         Thought Content: Thought content normal.         Judgment: Judgment normal.         Laboratory Data:  Labs have been reviewed.    Lab Results   Component Value Date    WBC 10.62 01/02/2025    HGB 12.2 01/02/2025    HCT 37.1 01/02/2025    MCV 91 01/02/2025     01/02/2025       Prothrombin testing (7/17/24):   Both the wild type (WT) F2 gene and the c.*97G>A pathogenic  variant ([id number]) were detected indicating a heterozygous  c.*97G>A genotype for this specimen. Heterozygosity for the  c.*97G>A variant is associated with overproduction of F2  prothrombin and an increased risk for venous thrombosis.     Imaging:    CTA chest (12/19/24):    No CT evidence for pulmonary emboli.     No detrimental interval change noted when compared to 06/21/2024.      Assessment:       1. History of pulmonary embolism    2. Prothrombin gene mutation    3. Morbid obesity    4. Chronic abdominal pain           Plan:     History of pulmonary embolism / prothrombin gene mutation  - I have reviewed  her chart  - diagnosed with pulmonary embolism in June 2024.  - on apixaban  - hypercoagulable workup revealed a prothrombin mutation  - CTA chest (12/19/24) was negative for pulmonary emboli  - she had seen a cardiologist, who recommended continuation of apixaban. I think it's reasonable to continue apixaban, either at 5mg PO BID or 2.5 mg PO BID.  - return to clinic as needed.    2. Morbid obesity  - Body mass index is 43.06 kg/m².  - adipose tissue can cause inflammation and may contribute to her intermittent leukocytosis    3. Chronic abdominal pain  - likely related to previous surgeries  - she would like to see gastroenterology at [facility]. I placed a referral.    - return to clinic as needed.    Alexi Anderson M.D.  Hematology/Oncology  [facility] - [address], [address]  [place] [address]  Phone: [phone]  Fax: [phone]

## 2025-01-06 ENCOUNTER — OFFICE VISIT (OUTPATIENT)
Dept: HEMATOLOGY/ONCOLOGY | Facility: CLINIC | Age: 65
End: 2025-01-06
Payer: MEDICARE

## 2025-01-06 ENCOUNTER — OFFICE VISIT (OUTPATIENT)
Dept: OPTOMETRY | Facility: CLINIC | Age: 65
End: 2025-01-06
Payer: MEDICARE

## 2025-01-06 ENCOUNTER — PATIENT MESSAGE (OUTPATIENT)
Dept: BARIATRICS | Facility: CLINIC | Age: 65
End: 2025-01-06
Payer: MEDICARE

## 2025-01-06 VITALS
HEART RATE: 79 BPM | DIASTOLIC BLOOD PRESSURE: 96 MMHG | WEIGHT: 266.75 LBS | RESPIRATION RATE: 18 BRPM | OXYGEN SATURATION: 98 % | BODY MASS INDEX: 43.06 KG/M2 | SYSTOLIC BLOOD PRESSURE: 163 MMHG

## 2025-01-06 DIAGNOSIS — E66.01 MORBID OBESITY: ICD-10-CM

## 2025-01-06 DIAGNOSIS — H04.123 DRY EYE SYNDROME OF BOTH EYES: ICD-10-CM

## 2025-01-06 DIAGNOSIS — R10.9 CHRONIC ABDOMINAL PAIN: ICD-10-CM

## 2025-01-06 DIAGNOSIS — H52.4 HYPEROPIA OF BOTH EYES WITH ASTIGMATISM AND PRESBYOPIA: ICD-10-CM

## 2025-01-06 DIAGNOSIS — D68.52 PROTHROMBIN GENE MUTATION: ICD-10-CM

## 2025-01-06 DIAGNOSIS — H52.03 HYPEROPIA OF BOTH EYES WITH ASTIGMATISM AND PRESBYOPIA: ICD-10-CM

## 2025-01-06 DIAGNOSIS — E11.9 TYPE 2 DIABETES MELLITUS WITHOUT COMPLICATION, WITHOUT LONG-TERM CURRENT USE OF INSULIN: ICD-10-CM

## 2025-01-06 DIAGNOSIS — E11.36 TYPE 2 DIABETES MELLITUS WITH CATARACT: ICD-10-CM

## 2025-01-06 DIAGNOSIS — G89.29 CHRONIC ABDOMINAL PAIN: ICD-10-CM

## 2025-01-06 DIAGNOSIS — Z86.711 HISTORY OF PULMONARY EMBOLISM: Primary | ICD-10-CM

## 2025-01-06 DIAGNOSIS — H52.203 HYPEROPIA OF BOTH EYES WITH ASTIGMATISM AND PRESBYOPIA: ICD-10-CM

## 2025-01-06 DIAGNOSIS — E11.9 TYPE 2 DIABETES MELLITUS WITHOUT RETINOPATHY: Primary | ICD-10-CM

## 2025-01-06 PROCEDURE — 99999 PR PBB SHADOW E&M-EST. PATIENT-LVL I: CPT | Mod: PBBFAC,,, | Performed by: OPTOMETRIST

## 2025-01-06 PROCEDURE — 99214 OFFICE O/P EST MOD 30 MIN: CPT | Mod: S$GLB,,, | Performed by: OPTOMETRIST

## 2025-01-06 PROCEDURE — 92015 DETERMINE REFRACTIVE STATE: CPT | Mod: S$GLB,,, | Performed by: OPTOMETRIST

## 2025-01-06 PROCEDURE — 99999 PR PBB SHADOW E&M-EST. PATIENT-LVL IV: CPT | Mod: PBBFAC,,, | Performed by: INTERNAL MEDICINE

## 2025-01-06 NOTE — PROGRESS NOTES
HPI    KAILASH: 2022  Chief complaint (CC): 65 yo F presents today for diabetic eye exam. Pt   reports vision changes at both ranges. Not wearing any vision corrections.     Glasses? -  Contacts? -  H/o eye surgery, injections or laser: -  H/o eye injury: -  Known eye conditions? -  Family h/o eye conditions? -  Eye gtts? -      (-) Flashes (-)  Floaters (-) Mucous   (-)  Tearing (-) Itching (-) Burning   (-) Headaches (-) Eye Pain/discomfort (-) Irritation   (-)  Redness (-) Double vision (-) Blurry vision    Diabetic? +  A1c? Hemoglobin A1C       Date                     Value               Ref Range             Status                06/22/2024               9.1 (H)             4.0 - 5.6 %           Final                   12/19/2023               8.4 (H)             4.0 - 5.6 %           Final                   06/10/2022               6.1 (H)             4.0 - 5.6 %           Final                Last edited by Denise Doty MA on 1/6/2025 10:45 AM.            Assessment /Plan     For exam results, see Encounter Report.    Type 2 diabetes mellitus without retinopathy    Type 2 diabetes mellitus without complication, without long-term current use of insulin    Type 2 diabetes mellitus with cataract    Dry eye syndrome of both eyes    Hyperopia of both eyes with astigmatism and presbyopia      MONITOR. ED PT ON ALL EXAM FINDINGS  RX FINAL SPECS   OCULAR HEALTH STABLE OD, OS   TYPE 2 DM W/O RETINOPATHY OU; CONTINUE W/ PCP FOR GLYCEMIC CONTROL; MONITOR.   DISCUSSED DRY EYE THERAPIES; MONITOR.   RTC 1 YR//PRN FOR REE/DFE

## 2025-01-12 ENCOUNTER — HOSPITAL ENCOUNTER (EMERGENCY)
Facility: HOSPITAL | Age: 65
Discharge: HOME | End: 2025-01-12
Attending: EMERGENCY MEDICINE
Payer: MEDICARE

## 2025-01-12 VITALS
BODY MASS INDEX: 42.75 KG/M2 | OXYGEN SATURATION: 99 % | TEMPERATURE: 101 F | RESPIRATION RATE: 24 BRPM | HEIGHT: 66 IN | DIASTOLIC BLOOD PRESSURE: 82 MMHG | HEART RATE: 99 BPM | SYSTOLIC BLOOD PRESSURE: 185 MMHG | WEIGHT: 266 LBS

## 2025-01-12 DIAGNOSIS — J10.1 INFLUENZA A: Primary | ICD-10-CM

## 2025-01-12 DIAGNOSIS — R05.9 COUGH, UNSPECIFIED TYPE: ICD-10-CM

## 2025-01-12 LAB
INFLUENZA A, MOLECULAR: POSITIVE
INFLUENZA B, MOLECULAR: NEGATIVE
SARS-COV-2 RDRP RESP QL NAA+PROBE: NEGATIVE
SPECIMEN SOURCE: ABNORMAL

## 2025-01-12 PROCEDURE — 99283 EMERGENCY DEPT VISIT LOW MDM: CPT | Mod: ER

## 2025-01-12 PROCEDURE — 87502 INFLUENZA DNA AMP PROBE: CPT | Mod: ER | Performed by: EMERGENCY MEDICINE

## 2025-01-12 PROCEDURE — 25000003 PHARM REV CODE 250: Mod: ER | Performed by: NURSE PRACTITIONER

## 2025-01-12 PROCEDURE — 87635 SARS-COV-2 COVID-19 AMP PRB: CPT | Mod: ER | Performed by: EMERGENCY MEDICINE

## 2025-01-12 RX ORDER — ACETAMINOPHEN 500 MG
1000 TABLET ORAL
Status: COMPLETED | OUTPATIENT
Start: 2025-01-12 | End: 2025-01-12

## 2025-01-12 RX ORDER — CETIRIZINE HYDROCHLORIDE 10 MG/1
10 TABLET ORAL DAILY
Qty: 30 TABLET | Refills: 0 | Status: SHIPPED | OUTPATIENT
Start: 2025-01-12 | End: 2026-01-12

## 2025-01-12 RX ORDER — BENZONATATE 100 MG/1
100 CAPSULE ORAL 3 TIMES DAILY PRN
Qty: 20 CAPSULE | Refills: 0 | Status: SHIPPED | OUTPATIENT
Start: 2025-01-12 | End: 2025-01-22

## 2025-01-12 RX ADMIN — ACETAMINOPHEN 1000 MG: 500 TABLET ORAL at 03:01

## 2025-01-12 NOTE — FIRST PROVIDER EVALUATION
Emergency Department TeleTriage Encounter Note      CHIEF COMPLAINT    Chief Complaint   Patient presents with    Cough     Cough, body aches, chills and fever X3 days.        VITAL SIGNS   Initial Vitals [01/12/25 1506]   BP Pulse Resp Temp SpO2   (!) 185/82 99 (!) 24 (!) 103 °F (39.4 °C) 99 %      MAP       --            ALLERGIES    Review of patient's allergies indicates:  No Known Allergies    PROVIDER TRIAGE NOTE  Fever, cough, body aches, chills for the past three days. Has not taken any medication for her symptoms. Temp 103 in triage.       ORDERS  Labs Reviewed   INFLUENZA A & B BY MOLECULAR   SARS-COV-2 RNA AMPLIFICATION, QUAL       ED Orders (720h ago, onward)      Start Ordered     Status Ordering Provider    01/12/25 1600 01/12/25 1551  acetaminophen tablet 1,000 mg  ED 1 Time         Ordered JOSE CARLTON    01/12/25 1507 01/12/25 1506  COVID-19 Rapid Screening  STAT         In process SUNIL BYRNE JR    01/12/25 1507 01/12/25 1506  Influenza A & B by Molecular  STAT         In process SUNIL BYRNE JR              Virtual Visit Note: The provider triage portion of this emergency department evaluation and documentation was performed via [employer], a HIPAA-compliant telemedicine application, in concert with a tele-presenter in the room. A face to face patient evaluation with one of my colleagues will occur once the patient is placed in an emergency department room.      DISCLAIMER: This note was prepared with [employer] voice recognition transcription software. Garbled syntax, mangled pronouns, and other bizarre constructions may be attributed to that software system.

## 2025-01-12 NOTE — ED PROVIDER NOTES
Encounter Date: 1/12/2025       History     Chief Complaint   Patient presents with    Cough     Cough, body aches, chills and fever X3 days.      64-year-old female presents to ED with concern of 3 day onset fevers, chills, body aches, intermittent headaches, cough and congestion.  No known sick contacts.  No current chest pain, abdominal pain, nausea, vomiting, diarrhea.  Tolerating p.o..  No other acute complaints at this time    The history is provided by the patient.     Review of patient's allergies indicates:  No Known Allergies  Past Medical History:   Diagnosis Date    Abnormal thyroid blood test 11/30/2016    Anxiety and depression 08/21/2014    Chronic hepatitis C without hepatic coma 07/10/2013    Dehiscence of operative wound     Diabetes mellitus     Encounter for blood transfusion     Enterocutaneous fistula     GERD (gastroesophageal reflux disease)     Hepatitis C     Hernia of unspecified site of abdominal cavity without mention of obstruction or gangrene     Hypertension     Liver fibrosis 12/06/2018    Lupus     Pulmonary embolism     Sleep apnea     10/2024 not wearing cpap bc mask is broken    Wears partial dentures      Past Surgical History:   Procedure Laterality Date    ABLATION, SVT, ACCESSORY PATHWAY N/A 2/1/2024    Procedure: Ablation, SVT, Accessory Pathway;  Surgeon: Davi García MD;  Location: [facility] EP LAB;  Service: Cardiology;  Laterality: N/A;  SVT, RFA, Carto, MAC, [place], 3 Prep    APPENDECTOMY      COLONOSCOPY      COLONOSCOPY N/A 12/28/2018    Procedure: COLONOSCOPY;  Surgeon: Kya Nicole MD;  Location: [facility] ENDO;  Service: Endoscopy;  Laterality: N/A;    COLONOSCOPY N/A 10/9/2024    Procedure: COLONOSCOPY;  Surgeon: Davi Vo MD;  [facility]  Service: Endoscopy;  Laterality: N/A;  referred by Dr. Efren Small, pt is on eliquis, orders received from Dr.Ifitkar Ryan (see documentation in visit note with cardio on july 25. ok to hold eliquis x3 days  and start lovenox on days eliquis is on hold, pt scheduled on 2nd floor hx bilat pe's, elevated bmi/49 and os    HYSTEROSCOPY WITH DILATION AND CURETTAGE OF UTERUS N/A 10/24/2024    Procedure: HYSTEROSCOPY, WITH DILATION AND CURETTAGE OF UTERUS;  Surgeon: Tania Calvin MD;  Location: [facility] OR;  Service: OB/GYN;  Laterality: N/A;    INGUINAL HERNIA REPAIR Right     KNEE SURGERY      LEFT HEART CATHETERIZATION Left 01/13/2021    Procedure: Left heart cath;  Surgeon: Antonio Doty III, MD;  Location: [facility] CATH LAB;  Service: Cardiology;  Laterality: Left;    LIVER BIOPSY      SMALL INTESTINE SURGERY      TOTAL KNEE ARTHROPLASTY Right 06/13/2022    Procedure: ARTHROPLASTY, KNEE, TOTAL;  Surgeon: Avel Matta MD;  Location: [facility] OR;  Service: Orthopedics;  Laterality: Right;    TUBAL LIGATION      UPPER GASTROINTESTINAL ENDOSCOPY       Family History   Problem Relation Name Age of Onset    Diabetes Mother      Hypertension Mother      Kidney failure Father      Lung cancer Paternal Grandfather      Colon cancer Neg Hx       Social History     Tobacco Use    Smoking status: Never     Passive exposure: Never    Smokeless tobacco: Never   Substance Use Topics    Alcohol use: Yes     Comment: beer occ    Drug use: No     Review of Systems   Constitutional:  Positive for chills and fever.   HENT:  Positive for congestion. Negative for ear pain and sore throat.    Respiratory:  Positive for cough. Negative for shortness of breath.    Cardiovascular:  Negative for chest pain.   Gastrointestinal:  Negative for abdominal pain, diarrhea, nausea and vomiting.   Musculoskeletal:  Positive for myalgias. Negative for neck pain and neck stiffness.   Neurological:  Positive for headaches.       Physical Exam     Initial Vitals [01/12/25 1506]   BP Pulse Resp Temp SpO2   (!) 185/82 99 (!) 24 (!) 103 °F (39.4 °C) 99 %      MAP       --         Physical Exam    Vitals reviewed.  Constitutional: She appears well-developed  and well-nourished. She is Obese . She is cooperative. She does not have a sickly appearance. She does not appear ill. No distress.   HENT:   Head: Normocephalic and atraumatic.   Eyes: EOM are normal.   Neck:   Normal range of motion.  Cardiovascular:  Normal rate, regular rhythm and normal heart sounds.           Pulmonary/Chest: Effort normal and breath sounds normal.   Speaking in full sentences.  No labored breathing.  Occasional dry cough noted.  Lungs are clear.   Musculoskeletal:      Cervical back: Normal range of motion.     Neurological: She is alert and oriented to person, place, and time. GCS eye subscore is 4. GCS verbal subscore is 5. GCS motor subscore is 6.   Psychiatric: She has a normal mood and affect. Her speech is normal and behavior is normal.         ED Course   Procedures  Labs Reviewed   INFLUENZA A & B BY MOLECULAR - Abnormal       Result Value    Influenza A, Molecular Positive (*)     Influenza B, Molecular Negative      Flu A & B Source Nasal swab     SARS-COV-2 RNA AMPLIFICATION, QUAL    SARS-CoV-2 RNA, Amplification, Qual Negative            Imaging Results    None          Medications   acetaminophen tablet 1,000 mg (1,000 mg Oral Given 1/12/25 1559)     Medical Decision Making  Patient presents with concern of 3 day onset URI like symptoms.  Fever on arrival 103.  Patient otherwise resting comfortably and in no apparent distress on exam.  Lungs are clear    DDx:  Including but not limited to COVID, influenza, viral, URI, allergy/irritant                ED Course as of 01/12/25 1723   Sun Jan 12, 2025   1722 Temp(!): 103 °F (39.4 °C)  Treated in ED with Tylenol [KS]   1722 Influenza A & B by Molecular(!)  Flu a positive [KS]   1722 COVID-19 Rapid Screening  Negative [KS]   1722 Results were discussed with patient, who continues to rest comfortably.  Lungs are clear.  Maintaining appropriate O2 sats.  Will continue with supportive care for viral illness.  Will avoid NSAIDs as patient  is on Eliquis.  Prescription written for Zyrtec and Tessalon Perles.  Encouraged Tylenol as needed for fever with hydration, rest and outpatient follow-up as needed.  ED return precautions were discussed at length.  Patient states her understanding and agrees with plan [KS]      ED Course User Index  [KS] Arsalan Palencia PA-C                           Clinical Impression:  Final diagnoses:  [R05.9] Cough, unspecified type  [J10.1] Influenza A (Primary)          ED Disposition Condition    Discharge Stable          ED Prescriptions       Medication Sig Dispense Start Date End Date Auth. Provider    benzonatate (TESSALON) 100 MG capsule Take 1 capsule (100 mg total) by mouth 3 (three) times daily as needed for Cough. 20 capsule 1/12/2025 1/22/2025 Arsalan Palencia PA-C    cetirizine (ZYRTEC) 10 MG tablet Take 1 tablet (10 mg total) by mouth once daily. 30 tablet 1/12/2025 1/12/2026 Arsalan Palencia PA-C          Follow-up Information       Follow up With Specialties Details Why Contact Info    Efren Small MD Family Medicine   [address] [place] [id number]  [phone]               Arsalan Palencia PA-C  01/12/25 3663

## 2025-01-12 NOTE — DISCHARGE INSTRUCTIONS

## 2025-01-14 ENCOUNTER — PATIENT MESSAGE (OUTPATIENT)
Dept: BARIATRICS | Facility: CLINIC | Age: 65
End: 2025-01-14
Payer: MEDICARE

## 2025-01-16 ENCOUNTER — TELEPHONE (OUTPATIENT)
Dept: GASTROENTEROLOGY | Facility: CLINIC | Age: 65
End: 2025-01-16
Payer: MEDICARE

## 2025-01-16 NOTE — TELEPHONE ENCOUNTER
----- Message from KRYSTYNA Eckert sent at 1/6/2025  3:54 PM CST -----  Good afternoon, Dr Anderson is referring this pt to GI. Do you guys accept peoples health insurance?

## 2025-02-10 ENCOUNTER — PATIENT MESSAGE (OUTPATIENT)
Dept: BARIATRICS | Facility: CLINIC | Age: 65
End: 2025-02-10
Payer: MEDICARE

## 2025-03-07 ENCOUNTER — TELEPHONE (OUTPATIENT)
Dept: BARIATRICS | Facility: CLINIC | Age: 65
End: 2025-03-07
Payer: MEDICARE

## 2025-03-07 NOTE — TELEPHONE ENCOUNTER
Calld pt to discuss Niraj sx wkup. No answer, LVM w/cb name and number. Will send portal message. RN f/u updated.

## 2025-03-10 ENCOUNTER — PATIENT MESSAGE (OUTPATIENT)
Dept: BARIATRICS | Facility: CLINIC | Age: 65
End: 2025-03-10
Payer: MEDICARE

## 2025-04-01 ENCOUNTER — OFFICE VISIT (OUTPATIENT)
Dept: RHEUMATOLOGY | Facility: CLINIC | Age: 65
End: 2025-04-01
Payer: MEDICARE

## 2025-04-01 VITALS
BODY MASS INDEX: 39.36 KG/M2 | HEIGHT: 66 IN | HEART RATE: 72 BPM | RESPIRATION RATE: 18 BRPM | DIASTOLIC BLOOD PRESSURE: 81 MMHG | WEIGHT: 244.94 LBS | OXYGEN SATURATION: 100 % | SYSTOLIC BLOOD PRESSURE: 127 MMHG

## 2025-04-01 DIAGNOSIS — E55.9 VITAMIN D DEFICIENCY: ICD-10-CM

## 2025-04-01 DIAGNOSIS — R76.8 ELEVATED RHEUMATOID FACTOR: ICD-10-CM

## 2025-04-01 DIAGNOSIS — E66.01 MORBID OBESITY: ICD-10-CM

## 2025-04-01 DIAGNOSIS — R76.8 POSITIVE ANA (ANTINUCLEAR ANTIBODY): ICD-10-CM

## 2025-04-01 DIAGNOSIS — M19.90 OSTEOARTHRITIS, UNSPECIFIED OSTEOARTHRITIS TYPE, UNSPECIFIED SITE: ICD-10-CM

## 2025-04-01 DIAGNOSIS — M79.7 FIBROMYALGIA: Primary | ICD-10-CM

## 2025-04-01 PROCEDURE — 99999 PR PBB SHADOW E&M-EST. PATIENT-LVL III: CPT | Mod: PBBFAC,,, | Performed by: STUDENT IN AN ORGANIZED HEALTH CARE EDUCATION/TRAINING PROGRAM

## 2025-04-01 PROCEDURE — 3079F DIAST BP 80-89 MM HG: CPT | Mod: CPTII,S$GLB,, | Performed by: STUDENT IN AN ORGANIZED HEALTH CARE EDUCATION/TRAINING PROGRAM

## 2025-04-01 PROCEDURE — 3008F BODY MASS INDEX DOCD: CPT | Mod: CPTII,S$GLB,, | Performed by: STUDENT IN AN ORGANIZED HEALTH CARE EDUCATION/TRAINING PROGRAM

## 2025-04-01 PROCEDURE — 3074F SYST BP LT 130 MM HG: CPT | Mod: CPTII,S$GLB,, | Performed by: STUDENT IN AN ORGANIZED HEALTH CARE EDUCATION/TRAINING PROGRAM

## 2025-04-01 PROCEDURE — 99214 OFFICE O/P EST MOD 30 MIN: CPT | Mod: S$GLB,,, | Performed by: STUDENT IN AN ORGANIZED HEALTH CARE EDUCATION/TRAINING PROGRAM

## 2025-04-01 RX ORDER — DILTIAZEM HYDROCHLORIDE 60 MG/1
60 TABLET, FILM COATED ORAL
COMMUNITY
Start: 2025-01-30

## 2025-04-01 RX ORDER — PREGABALIN 200 MG/1
200 CAPSULE ORAL 3 TIMES DAILY
Qty: 90 CAPSULE | Refills: 5 | Status: SHIPPED | OUTPATIENT
Start: 2025-04-01

## 2025-04-01 RX ORDER — LANCETS
EACH MISCELLANEOUS
COMMUNITY
Start: 2025-03-05

## 2025-04-01 RX ORDER — DIAZEPAM 5 MG/1
5 TABLET ORAL
COMMUNITY
Start: 2025-01-30

## 2025-04-01 RX ORDER — METOPROLOL TARTRATE 100 MG/1
100 TABLET ORAL
COMMUNITY
Start: 2025-01-30

## 2025-04-01 NOTE — PROGRESS NOTES
RHEUMATOLOGY OUTPATIENT CLINIC NOTE    4/1/2025    Attending Rheumatologist: Rubia Hester  Primary Care Provider: Efren Small MD   Physician Requesting Consultation: No referring provider defined for this encounter.  Chief Complaint/Reason For Consultation:  Follow-up and Fibromyalgia      Subjective:       HPI  Africa Wynne is a 64 y.o. Black or  female with hypertension, diabetes, SVT s/p ablation, DJD of the lumbar spine, history of hepatitis C treated, Knee OA who comes for evaluation of joint pain.     Interim history  -last visit in 12/2024  -she continues to deal with pain in both knees, left worse than right. She plans to see orthopedics but has not received a call yet. She had right knee replacement.   -following with hem/onc after PE and persistent leukocytosis which now has improved.  Plan is for her to continue anticoagulation with Eliquis for now.   -she is still having intermittent SOB. Saw pulm Dr. Zev Vora. SOB unlikele due to pulm issues and may be related to obesity.    -saw cardiology - no concerns for pulm hypertension.   -she reports that she will have angiogram soon per cardiologist Dr. Obregon.   -denies any other complaints.  No rashes, no skin thickening, no Raynaud's, no muscle weakness    Disease history     In 2016, she had complicated hernia repair, required prolonged hospital stay. Since then she reports diffuse pain in muscles and joint. No particular area that hurts more. Has not noted swelling over the years. Reports morning stiffness for a couple of minutes. She does not sleep well. Wakes up often to urinate.   Takes lyrica 200 mg QHS and feels that it does not help. Tried gabapentin and cymbalta which did not help.  She also takes nortryptiline.     She had right TKR but still has issues with that knee. Was told she needs replacement in the left one but she does not want to do it.   She also has significant OA in her lumbar  spine. MRI of the lumbar spine from 6/2023 showed Lumbar spondylosis, contributing to mild spinal canal stenosis at L4-5 and moderate neural foraminal narrowing at L5-S1. Prominent L4-5 facet joint arthropathy with grade I anterolisthesis. Advanced L5-S1 degenerative disc disease.    She reports shortness of breath for about 2 years, has to stop a lot to catch her breath. No cough, no pedal edema.   Follows with cardiologist  Cardiologist. Had recent SVT ablation     No skin rashes, sclerodactyly, dysphagia, sicca symptoms. Reports raynauds in fingertips with cold exposure, no gangrene.     Follows with bariatrics for possible sleeve gastrectomy. She has follow up with them on 2/21      Labs  12/2023  Vit D 13  CBC unremarkable  Kidney and liver function normal  TSH and free T4 normal    History of hepatitis C that was treated     Labs  1/2024  RF 26.3 high  PJ positive but no titer or pattern.   DSDNA, RNP, SM, SCL-70, SSA, SSB, Angle-1, chromatin, centromere negative  C3 203 (high)  C4 29 normal.   Uric acid 5.8    2016   PJ >1:2560 centromere pattern  Negative DSDNA, SSA, SSB, SM/RNP    Imaging    4/2023 Echocardiogram:   The left ventricle is normal in size with normal systolic function.  The estimated ejection fraction is 65%.  Normal left ventricular diastolic function.  Normal right ventricular size with normal right ventricular systolic function.  Mild tricuspid regurgitation.  Normal central venous pressure (3 mmHg).  The estimated PA systolic pressure is 33 mmHg.        Chronic comorbid conditions affecting medical decision making today:  Past Medical History:   Diagnosis Date    Abnormal thyroid blood test 11/30/2016    Anxiety and depression 08/21/2014    Chronic hepatitis C without hepatic coma 07/10/2013    Dehiscence of operative wound     Diabetes mellitus     Encounter for blood transfusion     Enterocutaneous fistula     GERD (gastroesophageal reflux disease)     Hepatitis C     Hernia of  unspecified site of abdominal cavity without mention of obstruction or gangrene     Hypertension     Liver fibrosis 12/06/2018    Lupus     Pulmonary embolism     Sleep apnea     10/2024 not wearing cpap bc mask is broken    Wears partial dentures      Past Surgical History:   Procedure Laterality Date    ABLATION, SVT, ACCESSORY PATHWAY N/A 2/1/2024    Procedure: Ablation, SVT, Accessory Pathway;  Surgeon: Davi García MD;  Location: [facility] EP LAB;  Service: Cardiology;  Laterality: N/A;  SVT, RFA, Carto, MAC, RI, 3 Prep    APPENDECTOMY      COLONOSCOPY      COLONOSCOPY N/A 12/28/2018    Procedure: COLONOSCOPY;  Surgeon: Kay Nicole MD;  Location: [facility] ENDO;  Service: Endoscopy;  Laterality: N/A;    COLONOSCOPY N/A 10/9/2024    Procedure: COLONOSCOPY;  Surgeon: Davi Vo MD;  Location: [facility] ENDO ([facility] FLR);  Service: Endoscopy;  Laterality: N/A;  referred by Dr. Efren Small, pt is on eliquis, orders received from Dr.Ifitkar Ryan (see documentation in visit note with cardio on july 25. ok to hold eliquis x3 days and start lovenox on days eliquis is on hold, pt scheduled on 2nd floor hx bilat pe's, elevated bmi/49 and os    HYSTEROSCOPY WITH DILATION AND CURETTAGE OF UTERUS N/A 10/24/2024    Procedure: HYSTEROSCOPY, WITH DILATION AND CURETTAGE OF UTERUS;  Surgeon: Tania Calvin MD;  Location: [facility] OR;  Service: OB/GYN;  Laterality: N/A;    INGUINAL HERNIA REPAIR Right     KNEE SURGERY      LEFT HEART CATHETERIZATION Left 01/13/2021    Procedure: Left heart cath;  Surgeon: Antonio Doty III, MD;  Location: [facility] CATH LAB;  Service: Cardiology;  Laterality: Left;    LIVER BIOPSY      SMALL INTESTINE SURGERY      TOTAL KNEE ARTHROPLASTY Right 06/13/2022    Procedure: ARTHROPLASTY, KNEE, TOTAL;  Surgeon: Avel Matta MD;  Location: [facility] OR;  Service: Orthopedics;  Laterality: Right;    TUBAL LIGATION      UPPER GASTROINTESTINAL ENDOSCOPY       Family History   Problem Relation Name Age of  Onset    Diabetes Mother      Hypertension Mother      Kidney failure Father      Lung cancer Paternal Grandfather      Colon cancer Neg Hx       Social History     Substance and Sexual Activity   Alcohol Use Yes    Comment: beer occ     Social History     Tobacco Use   Smoking Status Never    Passive exposure: Never   Smokeless Tobacco Never     Social History     Substance and Sexual Activity   Drug Use No       Current Outpatient Medications:     ACCU-CHEK SOFTCLIX LANCETS Misc, SMARTSI Strip(s) Topical Daily, Disp: , Rfl:     diazePAM (VALIUM) 5 MG tablet, Take 5 mg by mouth., Disp: , Rfl:     diltiaZEM (CARDIZEM) 60 MG tablet, Take 60 mg by mouth., Disp: , Rfl:     metoprolol tartrate (LOPRESSOR) 100 MG tablet, Take 100 mg by mouth., Disp: , Rfl:     atenoloL (TENORMIN) 100 MG tablet, Take 100 mg by mouth once daily., Disp: , Rfl:     atorvastatin (LIPITOR) 20 MG tablet, Take 1 tablet (20 mg total) by mouth once daily., Disp: 90 tablet, Rfl: 3    cetirizine (ZYRTEC) 10 MG tablet, Take 1 tablet (10 mg total) by mouth once daily., Disp: 30 tablet, Rfl: 0    clotrimazole (LOTRIMIN) 1 % cream, , Disp: , Rfl:     colchicine (COLCRYS) 0.6 mg tablet, Take 1 tablet (0.6 mg total) by mouth 2 (two) times daily., Disp: 30 tablet, Rfl: 0    diclofenac (VOLTAREN) 75 MG EC tablet, , Disp: , Rfl:     ELIQUIS 5 mg Tab, Take 5 mg by mouth 2 (two) times daily., Disp: , Rfl:     enoxaparin (LOVENOX) 120 mg/0.8 mL Syrg, Inject 0.8 mLs (120 mg total) into the skin 2 (two) times daily., Disp: 20 each, Rfl: 1    furosemide (LASIX) 40 MG tablet, TAKE 1 TABLET ORALLY ONCE A DAY FOR 3 DAYS, THEN EVERY OTHER DAY AS NEEDED FOR SHORTNESS OF BREATH OR DYSPNEA ON EXERTION., Disp: , Rfl:     HYDROcodone-acetaminophen (NORCO) 5-325 mg per tablet, Take 1 tablet by mouth every 6 (six) hours as needed for Pain., Disp: 5 tablet, Rfl: 0    JANUVIA 100 mg Tab, TAKE 1 TABLET EVERY DAY BY ORAL ROUTE FOR 90 DAYS., Disp: , Rfl:     JARDIANCE 10 mg  tablet, Take 10 mg by mouth., Disp: , Rfl:     LIDOcaine (LIDODERM) 5 %, Place 1 patch onto the skin once daily. Remove & Discard patch within 12 hours or as directed by MD, Disp: 15 patch, Rfl: 0    linaCLOtide (LINZESS) 72 mcg Cap capsule, Take 1 capsule by mouth once daily. As needed, Disp: , Rfl:     metFORMIN (GLUCOPHAGE) 500 MG tablet, Take 500 mg by mouth daily with breakfast., Disp: , Rfl:     MOUNJARO 5 mg/0.5 mL PnIj, Inject into the skin every 7 days., Disp: , Rfl:     nortriptyline (PAMELOR) 25 MG capsule, Take 25 mg by mouth every evening., Disp: , Rfl:     oxyCODONE-acetaminophen (PERCOCET)  mg per tablet, Take 1 tablet by mouth every 8 (eight) hours as needed., Disp: , Rfl:     penicillin v potassium (VEETID) 500 MG tablet, Take 500 mg by mouth every 6 (six) hours., Disp: , Rfl:     pregabalin (LYRICA) 200 MG Cap, Take 1 capsule (200 mg total) by mouth 3 (three) times daily., Disp: 90 capsule, Rfl: 5     Objective:         Vitals:    04/01/25 1006   BP: 127/81   Pulse: 72   Resp: 18             Physical Exam   Constitutional: She is oriented to person, place, and time. She appears well-developed.   HENT:   Head: Normocephalic.   Mouth/Throat: Mucous membranes are moist.   Cardiovascular: Normal rate and normal heart sounds.   Pulmonary/Chest: Effort normal and breath sounds normal.   Musculoskeletal:      Cervical back: Neck supple.      Comments: Cspine FROM no tenderness  Tspine FROM no tenderness  Lspine FROM, paraspinal tenderness  Shoulders: FROM; no synovitis;  Elbows: FROM; no synovitis; no tophi or nodules  Wrists: FROM; no synovitis;    MCPs: FROM; no synovitis;   PIPs:FROM; no synovitis;   DIPs: FROM; no synovitis;   HIPS: FROM  Knees: + crepitus, FROM; no synovitis; no instability  Ankles: FROM: no synovitis   Toes: no tenderness on palpation.      Positive tender points in forearms, arms, paraspinal area, thighs   Lymphadenopathy:     She has no cervical adenopathy.   Neurological:  "She is alert and oriented to person, place, and time.   Skin: No rash noted.   No skin rashes noted.   Normal nailfold capillaroscopy   Vitals reviewed.      Reviewed old and all outside pertinent medical records available.    All lab results personally reviewed and interpreted by me.  Lab Results   Component Value Date    WBC 11.21 03/26/2025    HGB 12.5 03/26/2025    HCT 35.9 (L) 03/26/2025    MCV 89 03/26/2025    MCH 30.9 03/26/2025    MCHC 34.8 03/26/2025    RDW 13.6 03/26/2025     03/26/2025    MPV 10.6 03/26/2025    NEUTROABS 7.85 07/13/2024    LYMPHOPCT 29.10 07/13/2024    PLTEST Appears Normal 03/26/2025       Lab Results   Component Value Date     03/26/2025    K 3.6 03/26/2025     (H) 03/26/2025    CO2 22 (L) 03/26/2025     (H) 01/31/2025    BUN 13 03/26/2025    CALCIUM 9.6 03/26/2025    PROT 6.8 01/02/2025    ALBUMIN 3.8 03/26/2025    BILITOT 0.3 03/26/2025    AST 16 03/26/2025    ALKPHOS 52 03/26/2025    ALT 12 03/26/2025       Lab Results   Component Value Date    COLORU Yellow 10/31/2024    APPEARANCEUA Clear 08/09/2022    SPECGRAV 1.020 10/31/2024    PHUR 5 10/31/2024    PROTEINUA Negative 08/09/2022    KETONESU Negaive 10/31/2024    LEUKOCYTESUR Negative 08/09/2022    NITRITE Negative 10/31/2024    UROBILINOGEN Normal 10/31/2024       Lab Results   Component Value Date    CRP 3.7 02/19/2024       Lab Results   Component Value Date    RF 19.0 (H) 02/19/2024    SEDRATE 5 02/19/2024    CCPANTIBODIE 0.5 02/19/2024       No components found for: "25OHVITDTOT", "52NZCZSU0", "28UOETUH3", "METHODNOTE"    No results found for: "URICACID"    No results found for: "QUANTIFERON", "HEPBCOREIGG", "HEPBSAB", "HEPBSAG", "HEPCAB"    Imaging:  All imaging reviewed and independently interpreted by me.         ASSESSMENT / PLAN:     Africa Wynne is a 64 y.o. Black or  female with:    1. Positive PJ (antinuclear antibody)- chronic and stable   -PJ >1>2560 centromere " pattern with negative subserologies. High centromere antibody in 1/2024. Negative SCL-70   -No s/s of connective tissue disease such as lupus, sjogren's, scleroderma at this moment but will need to monitor closely  -normal complements, ESR and CRP.     2. Abnormal echo  -echocardiogram from 6/2024 showed mild pulmonary hypertension.  But then repeat echocardiogram in August showed improvement in the pulmonary artery systolic pressure  -saw cardiology. No concerns for pulm hypertension.     3. Fibromyalgia - chronic and stable   -She has chronic widespread pain after prolonged stay in the hospital. This is her biggest concern  -has tried and failed gabapentin, cymbalta  -currently on notryptiline   -increase lyrica 200 mg TID.  verified   -normal CK level  -consider other therapeutic options in the future     2. Osteoarthritis of multiple sites- active  -She has OA in multiple areas such as knees and lumbar spine  -she has been dealing with worsening bilateral knee pain. Hold off on left CSI as it only lasted 2 weeks. .  -Discussed with patient that she should follow-up with Dr. Matta for right knee as she had TKR and I would not be able to inject the right knee.   -Weight loss would help her tremendously.     4. Elevated rheumatoid factor  -RF 19 -borderline with negative CCP.   -no clinical symptoms suggestive of rheumatoid arthritis  -reassurance     5. Vitamin D deficiency  -level in 12/2023 was 13 then in 2/2024 was 17.   -change to cholecalciferol 82341 IU weekly   -recheck level in the future    6. Morbid Obesity  - would benefit from decreasing at least 10% of body weight.  - recommended goal of losing 1 lb per week.  - consider nutritionist evaluation.  - would consider screening for RHONDA per PMD.    Follow up in about 6 months (around 10/1/2025).    Method of contact with patient concerns: [facility]    Disclaimer:  This note is prepared using voice recognition software and as such is  likely to have errors and has not been proof read. Please contact me for questions.     Rubia Hester M.D.  Rheumatology  [facility]

## 2025-04-08 ENCOUNTER — PATIENT MESSAGE (OUTPATIENT)
Dept: BARIATRICS | Facility: CLINIC | Age: 65
End: 2025-04-08
Payer: MEDICARE

## 2025-04-23 ENCOUNTER — TELEPHONE (OUTPATIENT)
Dept: BARIATRICS | Facility: CLINIC | Age: 65
End: 2025-04-23
Payer: MEDICARE

## 2025-04-23 NOTE — TELEPHONE ENCOUNTER
Called pt to inquire about continuing w/Niraj sx wkup. Pt stated she is on wt loss medication but is not helping her. FC appt scheduled. Time and date approved. Instructed pt to send portal message after completing FC appt requesting consult appt.

## 2025-05-13 ENCOUNTER — PATIENT MESSAGE (OUTPATIENT)
Dept: BARIATRICS | Facility: CLINIC | Age: 65
End: 2025-05-13
Payer: MEDICARE

## 2025-05-23 DIAGNOSIS — R07.9 CHEST PAIN: Primary | ICD-10-CM

## 2025-06-03 ENCOUNTER — PATIENT MESSAGE (OUTPATIENT)
Dept: BARIATRICS | Facility: CLINIC | Age: 65
End: 2025-06-03
Payer: MEDICAID

## 2025-06-03 ENCOUNTER — OFFICE VISIT (OUTPATIENT)
Dept: GASTROENTEROLOGY | Facility: CLINIC | Age: 65
End: 2025-06-03
Payer: MEDICARE

## 2025-06-03 VITALS — BODY MASS INDEX: 39.54 KG/M2 | WEIGHT: 246.06 LBS | HEIGHT: 66 IN

## 2025-06-03 DIAGNOSIS — R06.09 DYSPNEA ON EXERTION: Primary | ICD-10-CM

## 2025-06-03 DIAGNOSIS — Z86.711 HISTORY OF PULMONARY EMBOLISM: ICD-10-CM

## 2025-06-03 DIAGNOSIS — R07.82 INTERCOSTAL PAIN: ICD-10-CM

## 2025-06-03 PROCEDURE — 99999 PR PBB SHADOW E&M-EST. PATIENT-LVL IV: CPT | Mod: PBBFAC,,,

## 2025-06-03 RX ORDER — FAMOTIDINE 40 MG/1
40 TABLET, FILM COATED ORAL DAILY
Qty: 30 TABLET | Refills: 11 | Status: SHIPPED | OUTPATIENT
Start: 2025-06-03 | End: 2026-06-03

## 2025-06-04 ENCOUNTER — TELEPHONE (OUTPATIENT)
Dept: ENDOSCOPY | Facility: HOSPITAL | Age: 65
End: 2025-06-04
Payer: MEDICARE

## 2025-06-04 VITALS — WEIGHT: 242 LBS | BODY MASS INDEX: 38.89 KG/M2 | HEIGHT: 66 IN

## 2025-06-04 DIAGNOSIS — Z86.0100 HISTORY OF COLON POLYPS: Primary | ICD-10-CM

## 2025-06-04 DIAGNOSIS — Z12.11 ENCOUNTER FOR COLORECTAL CANCER SCREENING: Primary | ICD-10-CM

## 2025-06-04 DIAGNOSIS — R07.9 CHEST PAIN, UNSPECIFIED TYPE: ICD-10-CM

## 2025-06-04 DIAGNOSIS — Z12.12 ENCOUNTER FOR COLORECTAL CANCER SCREENING: Primary | ICD-10-CM

## 2025-06-05 ENCOUNTER — TELEPHONE (OUTPATIENT)
Dept: ENDOSCOPY | Facility: HOSPITAL | Age: 65
End: 2025-06-05
Payer: MEDICARE

## 2025-06-25 ENCOUNTER — TELEPHONE (OUTPATIENT)
Dept: BARIATRICS | Facility: CLINIC | Age: 65
End: 2025-06-25
Payer: MEDICARE

## 2025-06-25 NOTE — TELEPHONE ENCOUNTER
Called pt to inquire whether pt was still interested in Niraj sx.pt stated yes. Consult appts scheduled. Time and date approved per pt. RN f/u  updated.

## 2025-06-26 ENCOUNTER — TELEPHONE (OUTPATIENT)
Dept: GASTROENTEROLOGY | Facility: CLINIC | Age: 65
End: 2025-06-26
Payer: MEDICARE

## 2025-06-26 NOTE — TELEPHONE ENCOUNTER
Copied from CRM #[id number]. Topic: Appointments - Appointment Access  >> Jun 26, 2025  3:59 PM Sarah wrote:  Type:  Needs Medical Advice    Who Called:Africa called in regards to getting someone to call her back in with instructions on taking the prep this is pt second time calling in regards to this matter   Would the patient rather a call back or a response via [employer]? Call back   Best Call Back Number: pt  [phone]   Additional Information:

## 2025-06-26 NOTE — TELEPHONE ENCOUNTER
Copied from CRM #[id number]. Topic: Appointments - Appointment Access  >> Jun 26, 2025 11:29 AM Charlene wrote:  Type:  Patient Returning Call    Who Called: pt   Who Left Message for Patient: pt   Does the patient know what this is regarding?: pt need a call to see how she take her prep   Would the patient rather a call back or a response via [facility]? call  Best Call Back Number:[phone]  Additional Information: call

## 2025-07-07 ENCOUNTER — PATIENT MESSAGE (OUTPATIENT)
Dept: BARIATRICS | Facility: CLINIC | Age: 65
End: 2025-07-07
Payer: MEDICARE

## 2025-07-08 ENCOUNTER — PATIENT MESSAGE (OUTPATIENT)
Dept: BARIATRICS | Facility: CLINIC | Age: 65
End: 2025-07-08
Payer: MEDICARE

## 2025-07-29 ENCOUNTER — TELEPHONE (OUTPATIENT)
Dept: ENDOSCOPY | Facility: HOSPITAL | Age: 65
End: 2025-07-29
Payer: MEDICARE

## 2025-07-29 NOTE — TELEPHONE ENCOUNTER
Received patient's call for prep instructions. PEG prep instructions reviewed and verbalized. Patient verbalized an understanding.

## 2025-08-04 ENCOUNTER — ANESTHESIA EVENT (OUTPATIENT)
Dept: ENDOSCOPY | Facility: HOSPITAL | Age: 65
End: 2025-08-04
Payer: MEDICARE

## 2025-08-05 ENCOUNTER — PATIENT MESSAGE (OUTPATIENT)
Dept: GASTROENTEROLOGY | Facility: CLINIC | Age: 65
End: 2025-08-05
Payer: MEDICARE

## 2025-08-05 ENCOUNTER — ANESTHESIA (OUTPATIENT)
Dept: ENDOSCOPY | Facility: HOSPITAL | Age: 65
End: 2025-08-05
Payer: MEDICARE

## 2025-08-05 ENCOUNTER — PATIENT MESSAGE (OUTPATIENT)
Dept: GASTROENTEROLOGY | Facility: CLINIC | Age: 65
End: 2025-08-05

## 2025-08-05 ENCOUNTER — HOSPITAL ENCOUNTER (OUTPATIENT)
Facility: HOSPITAL | Age: 65
Discharge: HOME | End: 2025-08-05
Attending: INTERNAL MEDICINE | Admitting: INTERNAL MEDICINE
Payer: MEDICARE

## 2025-08-05 VITALS
DIASTOLIC BLOOD PRESSURE: 70 MMHG | RESPIRATION RATE: 18 BRPM | HEIGHT: 66 IN | SYSTOLIC BLOOD PRESSURE: 148 MMHG | HEART RATE: 49 BPM | BODY MASS INDEX: 39.22 KG/M2 | OXYGEN SATURATION: 100 % | WEIGHT: 244.06 LBS | TEMPERATURE: 98 F

## 2025-08-05 DIAGNOSIS — Z86.0100 HISTORY OF COLON POLYPS: ICD-10-CM

## 2025-08-05 DIAGNOSIS — K63.5 MULTIPLE POLYPS OF SIGMOID COLON: Primary | ICD-10-CM

## 2025-08-05 DIAGNOSIS — R07.9 CHEST PAIN, UNSPECIFIED TYPE: ICD-10-CM

## 2025-08-05 LAB — POCT GLUCOSE: 104 MG/DL (ref 70–110)

## 2025-08-05 PROCEDURE — 43235 EGD DIAGNOSTIC BRUSH WASH: CPT | Performed by: INTERNAL MEDICINE

## 2025-08-05 PROCEDURE — 27201089 HC SNARE, DISP (ANY): Performed by: INTERNAL MEDICINE

## 2025-08-05 PROCEDURE — 45385 COLONOSCOPY W/LESION REMOVAL: CPT | Performed by: INTERNAL MEDICINE

## 2025-08-05 PROCEDURE — 37000008 HC ANESTHESIA 1ST 15 MINUTES: Performed by: INTERNAL MEDICINE

## 2025-08-05 PROCEDURE — 43235 EGD DIAGNOSTIC BRUSH WASH: CPT | Mod: GC,,, | Performed by: INTERNAL MEDICINE

## 2025-08-05 PROCEDURE — 63600175 PHARM REV CODE 636 W HCPCS: Performed by: NURSE ANESTHETIST, CERTIFIED REGISTERED

## 2025-08-05 PROCEDURE — 88305 TISSUE EXAM BY PATHOLOGIST: CPT | Mod: 26,,, | Performed by: PATHOLOGY

## 2025-08-05 PROCEDURE — 82962 GLUCOSE BLOOD TEST: CPT | Performed by: INTERNAL MEDICINE

## 2025-08-05 PROCEDURE — 37000009 HC ANESTHESIA EA ADD 15 MINS: Performed by: INTERNAL MEDICINE

## 2025-08-05 PROCEDURE — 25000003 PHARM REV CODE 250: Performed by: INTERNAL MEDICINE

## 2025-08-05 PROCEDURE — 88305 TISSUE EXAM BY PATHOLOGIST: CPT | Mod: TC | Performed by: INTERNAL MEDICINE

## 2025-08-05 PROCEDURE — 45385 COLONOSCOPY W/LESION REMOVAL: CPT | Mod: 22,PT,GC, | Performed by: INTERNAL MEDICINE

## 2025-08-05 RX ORDER — PROPOFOL 10 MG/ML
VIAL (ML) INTRAVENOUS
Status: DISCONTINUED | OUTPATIENT
Start: 2025-08-05 | End: 2025-08-05

## 2025-08-05 RX ORDER — SODIUM CHLORIDE 9 MG/ML
INJECTION, SOLUTION INTRAVENOUS CONTINUOUS
Status: DISCONTINUED | OUTPATIENT
Start: 2025-08-05 | End: 2025-08-05 | Stop reason: HOSPADM

## 2025-08-05 RX ADMIN — SODIUM CHLORIDE: 0.9 INJECTION, SOLUTION INTRAVENOUS at 08:08

## 2025-08-05 RX ADMIN — PROPOFOL 30 MG: 10 INJECTION, EMULSION INTRAVENOUS at 08:08

## 2025-08-05 RX ADMIN — PROPOFOL 50 MG: 10 INJECTION, EMULSION INTRAVENOUS at 08:08

## 2025-08-05 RX ADMIN — PROPOFOL 100 MG: 10 INJECTION, EMULSION INTRAVENOUS at 08:08

## 2025-08-05 RX ADMIN — PROPOFOL 150 MCG/KG/MIN: 10 INJECTION, EMULSION INTRAVENOUS at 08:08

## 2025-08-05 NOTE — ANESTHESIA PREPROCEDURE EVALUATION
08/05/2025  Africa Wynne is a 64 y.o., female.      Pre-op Assessment    I have reviewed the Patient Summary Reports.    I have reviewed the NPO Status.   I have reviewed the Medications.     Review of Systems  Anesthesia Hx:             Denies Family Hx of Anesthesia complications.    Denies Personal Hx of Anesthesia complications.                    Cardiovascular:     Hypertension    Dysrhythmias          ECG has been reviewed. Anticoagulated; hx of SVT                           Pulmonary:      Shortness of breath  Sleep Apnea                Hepatic/GI:     GERD Liver Disease, Hepatitis              Musculoskeletal:  Arthritis               Endocrine:  Diabetes, type 2         Obesity / BMI > 30      Physical Exam  General: Alert, Oriented and Well nourished    Airway:  Mallampati: II   Mouth Opening: Normal  TM Distance: Normal  Tongue: Normal  Neck ROM: Normal ROM    Dental:  Intact    Chest/Lungs:  Clear to auscultation    Heart:  Rate: Normal  Rhythm: Regular Rhythm  Sounds: Normal    Abdomen:  Normal      Anesthesia Plan  Type of Anesthesia, risks & benefits discussed:    Anesthesia Type: Gen Natural Airway  Intra-op Monitoring Plan: Standard ASA Monitors  Induction:  IV  Informed Consent: Informed consent signed with the Patient and all parties understand the risks and agree with anesthesia plan.  All questions answered.   ASA Score: 2  Day of Surgery Review of History & Physical: H&P Update referred to the surgeon/provider.    Ready For Surgery From Anesthesia Perspective.     .

## 2025-08-05 NOTE — TRANSFER OF CARE
"Anesthesia Transfer of Care Note    Patient: Africa Wynne    Procedure(s) Performed: Procedure(s) (LRB):  EGD (ESOPHAGOGASTRODUODENOSCOPY) (N/A)  COLONOSCOPY, SCREENING, HIGH RISK PATIENT (N/A)    Patient location: GI    Anesthesia Type: MAC    Transport from OR: Transported from OR on room air with adequate spontaneous ventilation    Post pain: adequate analgesia    Post assessment: no apparent anesthetic complications and tolerated procedure well    Post vital signs: stable    Level of consciousness: awake and alert    Nausea/Vomiting: no nausea/vomiting    Complications: none    Transfer of care protocol was followedComments: VSS; See GI flowsheet      Last vitals: Visit Vitals  BP (!) 102/57 (BP Location: Left arm, Patient Position: Lying)   Pulse 63   Temp 36.6 °C (97.9 °F) (Temporal)   Resp 18   Ht 5' 6" (1.676 m)   Wt 110.7 kg (244 lb 0.8 oz)   LMP  (LMP Unknown)   SpO2 96%   Breastfeeding No   BMI 39.39 kg/m²     "

## 2025-08-05 NOTE — PROVATION PATIENT INSTRUCTIONS
Discharge Summary/Instructions after an Endoscopic Procedure  Patient Name: Africa Wynne  Patient MRN: [id number]  Patient YOB: 1960 Tuesday, August 5, 2025  Byron Soliman MD  Dear patient,  As a result of recent federal legislation (The Federal Cures Act), you may   receive lab or pathology results from your procedure in your [facility]   account before your physician is able to contact you. Your physician or   their representative will relay the results to you with their   recommendations at their soonest availability.  Thank you,  RESTRICTIONS:  During your procedure today, you received medications for sedation.  These   medications may affect your judgment, balance and coordination.  Therefore,   for 24 hours, you have the following restrictions:   - DO NOT drive a car, operate machinery, make legal/financial decisions,   sign important papers or drink alcohol.    ACTIVITY:  Today: no heavy lifting, straining or running due to procedural   sedation/anesthesia.  The following day: return to full activity including work.  DIET:  Eat and drink normally unless instructed otherwise.     TREATMENT FOR COMMON SIDE EFFECTS:  - Mild abdominal pain, nausea, belching, bloating or excessive gas:  rest,   eat lightly and use a heating pad.  - Sore Throat: treat with throat lozenges and/or gargle with warm salt   water.  - Because air was used during the procedure, expelling large amounts of air   from your rectum or belching is normal.  - If a bowel prep was taken, you may not have a bowel movement for 1-3 days.    This is normal.  SYMPTOMS TO WATCH FOR AND REPORT TO YOUR PHYSICIAN:  1. Abdominal pain or bloating, other than gas cramps.  2. Chest pain.  3. Back pain.  4. Signs of infection such as: chills or fever occurring within 24 hours   after the procedure.  5. Rectal bleeding, which would show as bright red, maroon, or black stools.   (A tablespoon of blood from the rectum is not serious, especially  if   hemorrhoids are present.)  6. Vomiting.  7. Weakness or dizziness.  GO DIRECTLY TO THE NEAREST EMERGENCY ROOM IF YOU HAVE ANY OF THE FOLLOWING:      Difficulty breathing              Chills and/or fever over 101 F   Persistent vomiting and/or vomiting blood   Severe abdominal pain   Severe chest pain   Black, tarry stools   Bleeding- more than one tablespoon   Any other symptom or condition that you feel may need urgent attention  Your doctor recommends these additional instructions:  If any biopsies were taken, your doctors clinic will contact you in 1 to 2   weeks with any results.  - Discharge patient to home.   - Patient has a contact number available for emergencies.  The signs and   symptoms of potential delayed complications were discussed with the   patient.  Return to normal activities tomorrow.  Written discharge   instructions were provided to the patient.   - Resume previous diet.   - Continue present medications.   - Await pathology results.   - Repeat colonoscopy in 1 year for surveillance of multiple polyps. Due to   difficulty, recommend extended time slot (60-min).  - Refer to a genetics counselor.   - Resume Eliquis (apixaban) at prior dose tomorrow.  For questions, problems or results please call your physician - Byron Soliman MD at Work:  [phone].  [facility], EMERGENCY ROOM PHONE NUMBER: [phone]  IF A COMPLICATION OR EMERGENCY SITUATION ARISES AND YOU ARE UNABLE TO REACH   YOUR PHYSICIAN - GO DIRECTLY TO THE EMERGENCY ROOM.  Byron Soliman MD  8/5/2025 9:57:48 AM  This report has been verified and signed electronically.  Dear patient,  As a result of recent federal legislation (The Federal Cures Act), you may   receive lab or pathology results from your procedure in your [facility]   account before your physician is able to contact you. Your physician or   their representative will relay the results to you with their   recommendations at their soonest  availability.  Thank you,  PROVATION

## 2025-08-05 NOTE — H&P
Short Stay Endoscopy History and Physical    PCP - Efren Small MD    Procedure - EGD + Colonoscopy  ASA - per anesthesia  Mallampati - per anesthesia  History of Anesthesia problems - no  Family history Anesthesia problems - no   Plan of anesthesia - General    HPI:  This is a 64 y.o. female here for evaluation of : asymptomatic screening exam and personal history of colon polyps as well as EGD for evaluation of chest pain.     Pt states she has had chest pain for years, constant burning epigastric pain. Has not tried PPI before. Last took eliquis one week ago. Has had constipation and is taking linzess. No hematochezia or melena. No family history of colon cancer.     Last colonoscopy 10/09/2024 with 1 5mm polyp in ascending colon/cecum, 8 polyps in transverse, 2 sigmoid polyps. Path showing tubular adenomas in cecum and transverse colon.  Hyperplastic polyps in sigmoid.     No prior EGD.      ROS:  Constitutional: No fevers, chills, No weight loss  CV: No chest pain  Pulm: No cough, No shortness of breath  GI: see HPI  Derm: No rash    Medical History:  has a past medical history of Abnormal thyroid blood test (11/30/2016), Anxiety and depression (08/21/2014), Chronic hepatitis C without hepatic coma (07/10/2013), Dehiscence of operative wound, Diabetes mellitus, Encounter for blood transfusion, Enterocutaneous fistula, GERD (gastroesophageal reflux disease), Hepatitis C, Hernia of unspecified site of abdominal cavity without mention of obstruction or gangrene, Hypertension, Liver fibrosis (12/06/2018), Lupus, Pulmonary embolism, Sleep apnea, and Wears partial dentures.    Surgical History:  has a past surgical history that includes Small intestine surgery; Tubal ligation; Appendectomy; Inguinal hernia repair (Right); Colonoscopy; Upper gastrointestinal endoscopy; Liver biopsy; Colonoscopy (N/A, 12/28/2018); Left heart catheterization (Left, 01/13/2021); Total knee arthroplasty (Right, 06/13/2022); Knee  surgery; ablation, svt, accessory pathway (N/A, 2/1/2024); Colonoscopy (N/A, 10/9/2024); and Hysteroscopy with dilation and curettage of uterus (N/A, 10/24/2024).    Family History: family history includes Diabetes in her mother; Hypertension in her mother; Kidney failure in her father; Lung cancer in her paternal grandfather.. Otherwise no colon cancer, inflammatory bowel disease, or GI malignancies.    Social History:  reports that she has never smoked. She has never been exposed to tobacco smoke. She has never used smokeless tobacco. She reports current alcohol use. She reports that she does not use drugs.    Review of patient's allergies indicates:  No Known Allergies    Medications:   Prescriptions Prior to Admission[1]      Physical Exam:    Vital Signs: There were no vitals filed for this visit.    General Appearance: Well appearing in no acute distress  Eyes:    No scleral icterus  ENT: Neck supple, Lips, mucosa, and tongue normal; teeth and gums normal  Abdomen: Soft, non tender, non distended with positive bowel sounds. No hepatosplenomegaly, ascites, or mass.  Extremities: 2+ pulses, no clubbing, cyanosis or edema  Skin: No rash      Labs:  Lab Results   Component Value Date    WBC 12.74 (H) 05/20/2025    HGB 13.0 05/20/2025    HCT 38.8 05/20/2025     05/20/2025    CHOL 159 12/19/2023    TRIG 154 (H) 12/19/2023    HDL 41 12/19/2023    ALT 14 05/20/2025    AST 15 05/20/2025     05/20/2025    K 3.6 05/20/2025     05/20/2025    CREATININE 0.9 05/20/2025    BUN 15 05/20/2025    CO2 23 05/20/2025    TSH 3.521 12/19/2023    INR 1.03 07/13/2024    HGBA1C 9.1 (H) 06/22/2024       I have explained the risks and benefits of endoscopy procedures to the patient including but not limited to bleeding, perforation, infection, and death.  The patient was asked if they understand and allowed to ask any further questions to their satisfaction.    Chantel Santoyo MD         [1]   Medications Prior to  Admission   Medication Sig Dispense Refill Last Dose/Taking    atenoloL (TENORMIN) 100 MG tablet Take 100 mg by mouth once daily.   Past Week    colchicine (COLCRYS) 0.6 mg tablet Take 1 tablet (0.6 mg total) by mouth 2 (two) times daily. 30 tablet 0 Past Week    diltiaZEM (CARDIZEM) 60 MG tablet Take 60 mg by mouth.   Past Week    famotidine (PEPCID) 40 MG tablet Take 1 tablet (40 mg total) by mouth once daily. 30 tablet 11 Past Week    furosemide (LASIX) 40 MG tablet TAKE 1 TABLET ORALLY ONCE A DAY FOR 3 DAYS, THEN EVERY OTHER DAY AS NEEDED FOR SHORTNESS OF BREATH OR DYSPNEA ON EXERTION.   Past Week    JANUVIA 100 mg Tab TAKE 1 TABLET EVERY DAY BY ORAL ROUTE FOR 90 DAYS.   Past Week    linaCLOtide (LINZESS) 72 mcg Cap capsule Take 1 capsule by mouth once daily. As needed   Past Week    metFORMIN (GLUCOPHAGE) 500 MG tablet Take 500 mg by mouth daily with breakfast.   Past Week    metoprolol tartrate (LOPRESSOR) 100 MG tablet Take 100 mg by mouth.   Past Week    nortriptyline (PAMELOR) 25 MG capsule Take 25 mg by mouth every evening.   Past Week    pantoprazole (PROTONIX) 40 MG tablet Take 1 tablet (40 mg total) by mouth once daily. 30 tablet 3 Past Week    penicillin v potassium (VEETID) 500 MG tablet Take 500 mg by mouth every 6 (six) hours.   Past Week    pregabalin (LYRICA) 200 MG Cap Take 1 capsule (200 mg total) by mouth 3 (three) times daily. 90 capsule 5 Past Week    ACCU-CHEK SOFTCLIX LANCETS Misc SMARTSI Strip(s) Topical Daily       albuterol (PROVENTIL/VENTOLIN HFA) 90 mcg/actuation inhaler Inhale 2 puffs into the lungs every 4 (four) hours as needed for Shortness of Breath. 8 g 3 More than a month    atorvastatin (LIPITOR) 20 MG tablet Take 1 tablet (20 mg total) by mouth once daily. 90 tablet 3     cetirizine (ZYRTEC) 10 MG tablet Take 1 tablet (10 mg total) by mouth once daily. 30 tablet 0     clotrimazole (LOTRIMIN) 1 % cream        diazePAM (VALIUM) 5 MG tablet Take 5 mg by mouth.       diclofenac  (VOLTAREN) 75 MG EC tablet        ELIQUIS 5 mg Tab Take 5 mg by mouth 2 (two) times daily.   7/30/2025    enoxaparin (LOVENOX) 120 mg/0.8 mL Syrg Inject 0.8 mLs (120 mg total) into the skin 2 (two) times daily. 20 each 1 More than a month    HYDROcodone-acetaminophen (NORCO) 5-325 mg per tablet Take 1 tablet by mouth every 6 (six) hours as needed for Pain. 5 tablet 0     JARDIANCE 10 mg tablet Take 10 mg by mouth.   7/30/2025    LIDOcaine (LIDODERM) 5 % Place 1 patch onto the skin once daily. Remove & Discard patch within 12 hours or as directed by MD 15 patch 0     MOUNJARO 5 mg/0.5 mL PnIj Inject into the skin every 7 days.   7/25/2025    oxyCODONE-acetaminophen (PERCOCET)  mg per tablet Take 1 tablet by mouth every 8 (eight) hours as needed.

## 2025-08-05 NOTE — [employer] PATIENT INSTRUCTIONS
Discharge Summary/Instructions after an Endoscopic Procedure  Patient Name: Africa Wynne  Patient MRN: [id number]  Patient YOB: 1960 Tuesday, August 5, 2025  Byron Soliman MD  Dear patient,  As a result of recent federal legislation (The Federal Cures Act), you may   receive lab or pathology results from your procedure in your [facility]   account before your physician is able to contact you. Your physician or   their representative will relay the results to you with their   recommendations at their soonest availability.  Thank you,  RESTRICTIONS:  During your procedure today, you received medications for sedation.  These   medications may affect your judgment, balance and coordination.  Therefore,   for 24 hours, you have the following restrictions:   - DO NOT drive a car, operate machinery, make legal/financial decisions,   sign important papers or drink alcohol.    ACTIVITY:  Today: no heavy lifting, straining or running due to procedural   sedation/anesthesia.  The following day: return to full activity including work.  DIET:  Eat and drink normally unless instructed otherwise.     TREATMENT FOR COMMON SIDE EFFECTS:  - Mild abdominal pain, nausea, belching, bloating or excessive gas:  rest,   eat lightly and use a heating pad.  - Sore Throat: treat with throat lozenges and/or gargle with warm salt   water.  - Because air was used during the procedure, expelling large amounts of air   from your rectum or belching is normal.  - If a bowel prep was taken, you may not have a bowel movement for 1-3 days.    This is normal.  SYMPTOMS TO WATCH FOR AND REPORT TO YOUR PHYSICIAN:  1. Abdominal pain or bloating, other than gas cramps.  2. Chest pain.  3. Back pain.  4. Signs of infection such as: chills or fever occurring within 24 hours   after the procedure.  5. Rectal bleeding, which would show as bright red, maroon, or black stools.   (A tablespoon of blood from the rectum is not serious, especially  if   hemorrhoids are present.)  6. Vomiting.  7. Weakness or dizziness.  GO DIRECTLY TO THE NEAREST EMERGENCY ROOM IF YOU HAVE ANY OF THE FOLLOWING:      Difficulty breathing              Chills and/or fever over 101 F   Persistent vomiting and/or vomiting blood   Severe abdominal pain   Severe chest pain   Black, tarry stools   Bleeding- more than one tablespoon   Any other symptom or condition that you feel may need urgent attention  Your doctor recommends these additional instructions:  If any biopsies were taken, your doctors clinic will contact you in 1 to 2   weeks with any results.  - Return to [facility] for evaluation of the subepithelial   lesion.    - Perform a colonoscopy to follow this procedure.   - See colonoscopy report for further recommendations.  For questions, problems or results please call your physician - Byron Soliman MD at Work:  [phone].  [facility] [place], EMERGENCY ROOM PHONE NUMBER: [phone]  IF A COMPLICATION OR EMERGENCY SITUATION ARISES AND YOU ARE UNABLE TO REACH   YOUR PHYSICIAN - GO DIRECTLY TO THE EMERGENCY ROOM.  Byron Soliman MD  8/5/2025 10:02:31 AM  This report has been verified and signed electronically.  Dear patient,  As a result of recent federal legislation (The Federal Cures Act), you may   receive lab or pathology results from your procedure in your [facility]   account before your physician is able to contact you. Your physician or   their representative will relay the results to you with their   recommendations at their soonest availability.  Thank you,  [employer]

## 2025-08-05 NOTE — ANESTHESIA POSTPROCEDURE EVALUATION
Anesthesia Post Evaluation    Patient: Africa Wynne    Procedure(s) Performed: Procedure(s) (LRB):  EGD (ESOPHAGOGASTRODUODENOSCOPY) (N/A)  COLONOSCOPY, SCREENING, HIGH RISK PATIENT (N/A)    Final Anesthesia Type: general      Patient location during evaluation: GI PACU  Patient participation: Yes- Able to Participate  Level of consciousness: awake and alert and oriented  Post-procedure vital signs: reviewed and stable  Pain management: adequate  Airway patency: patent    PONV status at discharge: No PONV  Anesthetic complications: no      Cardiovascular status: stable  Respiratory status: unassisted, spontaneous ventilation and room air  Hydration status: euvolemic  Follow-up not needed.          Vitals Value Taken Time   /57 08/05/25 09:55   Temp 36.6 °C (97.9 °F) 08/05/25 09:55   Pulse 63 08/05/25 09:55   Resp 18 08/05/25 09:55   SpO2 96 % 08/05/25 09:55         No case tracking events are documented in the log.      Pain/James Score: James Score: 10 (8/5/2025  9:57 AM)

## 2025-08-06 LAB
ESTROGEN SERPL-MCNC: NORMAL PG/ML
INSULIN SERPL-ACNC: NORMAL U[IU]/ML
LAB AP CLINICAL INFORMATION: NORMAL
LAB AP GROSS DESCRIPTION: NORMAL
LAB AP PERFORMING LOCATION(S): NORMAL
LAB AP REPORT FOOTNOTES: NORMAL

## 2025-08-09 ENCOUNTER — HOSPITAL ENCOUNTER (EMERGENCY)
Facility: HOSPITAL | Age: 65
Discharge: HOME | End: 2025-08-09
Attending: STUDENT IN AN ORGANIZED HEALTH CARE EDUCATION/TRAINING PROGRAM
Payer: MEDICARE

## 2025-08-09 VITALS
SYSTOLIC BLOOD PRESSURE: 152 MMHG | WEIGHT: 244 LBS | RESPIRATION RATE: 18 BRPM | TEMPERATURE: 97 F | HEART RATE: 61 BPM | DIASTOLIC BLOOD PRESSURE: 70 MMHG | OXYGEN SATURATION: 99 % | BODY MASS INDEX: 39.21 KG/M2 | HEIGHT: 66 IN

## 2025-08-09 DIAGNOSIS — R10.9 ABDOMINAL PAIN: ICD-10-CM

## 2025-08-09 DIAGNOSIS — M54.50 BILATERAL LOW BACK PAIN WITHOUT SCIATICA, UNSPECIFIED CHRONICITY: Primary | ICD-10-CM

## 2025-08-09 DIAGNOSIS — S39.011A STRAIN OF ABDOMINAL MUSCLE, INITIAL ENCOUNTER: ICD-10-CM

## 2025-08-09 DIAGNOSIS — M54.6 THORACIC BACK PAIN: ICD-10-CM

## 2025-08-09 DIAGNOSIS — S39.012A LUMBAR STRAIN, INITIAL ENCOUNTER: ICD-10-CM

## 2025-08-09 LAB
ABSOLUTE EOSINOPHIL (OHS): 0.27 K/UL
ABSOLUTE MONOCYTE (OHS): 0.84 K/UL (ref 0.3–1)
ABSOLUTE NEUTROPHIL COUNT (OHS): 7.46 K/UL (ref 1.8–7.7)
ALBUMIN SERPL BCP-MCNC: 3.7 G/DL (ref 3.5–5.2)
ALP SERPL-CCNC: 58 UNIT/L (ref 40–150)
ALT SERPL W/O P-5'-P-CCNC: 12 UNIT/L (ref 0–55)
ANION GAP (OHS): 9 MMOL/L (ref 8–16)
AST SERPL-CCNC: 19 UNIT/L (ref 0–50)
BASOPHILS # BLD AUTO: 0.03 K/UL
BASOPHILS NFR BLD AUTO: 0.3 %
BILIRUB SERPL-MCNC: 0.4 MG/DL (ref 0.1–1)
BILIRUB UR QL STRIP.AUTO: NEGATIVE
BUN SERPL-MCNC: 14 MG/DL (ref 8–23)
BUN SERPL-MCNC: 16 MG/DL (ref 6–30)
CALCIUM SERPL-MCNC: 9 MG/DL (ref 8.7–10.5)
CHLORIDE SERPL-SCNC: 105 MMOL/L (ref 95–110)
CHLORIDE SERPL-SCNC: 110 MMOL/L (ref 95–110)
CLARITY UR: CLEAR
CO2 SERPL-SCNC: 21 MMOL/L (ref 23–29)
COLOR UR AUTO: YELLOW
CREAT SERPL-MCNC: 0.7 MG/DL (ref 0.5–1.4)
CREAT SERPL-MCNC: 0.8 MG/DL (ref 0.5–1.4)
ERYTHROCYTE [DISTWIDTH] IN BLOOD BY AUTOMATED COUNT: 13.3 % (ref 11.5–14.5)
GFR SERPLBLD CREATININE-BSD FMLA CKD-EPI: >60 ML/MIN/1.73/M2
GLUCOSE SERPL-MCNC: 86 MG/DL (ref 70–110)
GLUCOSE SERPL-MCNC: 86 MG/DL (ref 70–110)
GLUCOSE UR QL STRIP: NEGATIVE
HCT VFR BLD AUTO: 39.5 % (ref 37–48.5)
HCT VFR BLD CALC: 34 %PCV (ref 36–54)
HCV AB SERPL QL IA: REACTIVE
HGB BLD-MCNC: 13 GM/DL (ref 12–16)
HGB UR QL STRIP: NEGATIVE
HIV 1+2 AB+HIV1 P24 AG SERPL QL IA: NORMAL
IMM GRANULOCYTES # BLD AUTO: 0.02 K/UL (ref 0–0.04)
IMM GRANULOCYTES NFR BLD AUTO: 0.2 % (ref 0–0.5)
KETONES UR QL STRIP: NEGATIVE
LEUKOCYTE ESTERASE UR QL STRIP: NEGATIVE
LIPASE SERPL-CCNC: 24 U/L (ref 4–60)
LYMPHOCYTES # BLD AUTO: 3.29 K/UL (ref 1–4.8)
MCH RBC QN AUTO: 29.8 PG (ref 27–31)
MCHC RBC AUTO-ENTMCNC: 32.9 G/DL (ref 32–36)
MCV RBC AUTO: 91 FL (ref 82–98)
NITRITE UR QL STRIP: NEGATIVE
NUCLEATED RBC (/100WBC) (OHS): 0 /100 WBC
OHS QRS DURATION: 78 MS
OHS QTC CALCULATION: 422 MS
PH UR STRIP: 6 [PH]
PLATELET # BLD AUTO: 227 K/UL (ref 150–450)
PMV BLD AUTO: 10.1 FL (ref 9.2–12.9)
POC IONIZED CALCIUM: 1.15 MMOL/L (ref 1.06–1.42)
POC TCO2 (MEASURED): 23 MMOL/L (ref 23–29)
POTASSIUM BLD-SCNC: 4 MMOL/L (ref 3.5–5.1)
POTASSIUM SERPL-SCNC: 3.9 MMOL/L (ref 3.5–5.1)
PROT SERPL-MCNC: 7 GM/DL (ref 6–8.4)
PROT UR QL STRIP: ABNORMAL
RBC # BLD AUTO: 4.36 M/UL (ref 4–5.4)
RELATIVE EOSINOPHIL (OHS): 2.3 %
RELATIVE LYMPHOCYTE (OHS): 27.6 % (ref 18–48)
RELATIVE MONOCYTE (OHS): 7.1 % (ref 4–15)
RELATIVE NEUTROPHIL (OHS): 62.5 % (ref 38–73)
SAMPLE: ABNORMAL
SODIUM BLD-SCNC: 140 MMOL/L (ref 136–145)
SODIUM SERPL-SCNC: 140 MMOL/L (ref 136–145)
SP GR UR STRIP: >=1.03
UROBILINOGEN UR STRIP-ACNC: ABNORMAL EU/DL
WBC # BLD AUTO: 11.91 K/UL (ref 3.9–12.7)

## 2025-08-09 PROCEDURE — 25500020 PHARM REV CODE 255: Performed by: NURSE PRACTITIONER

## 2025-08-09 PROCEDURE — 87389 HIV-1 AG W/HIV-1&-2 AB AG IA: CPT | Performed by: PHYSICIAN ASSISTANT

## 2025-08-09 PROCEDURE — 80053 COMPREHEN METABOLIC PANEL: CPT | Performed by: NURSE PRACTITIONER

## 2025-08-09 PROCEDURE — 86803 HEPATITIS C AB TEST: CPT | Performed by: PHYSICIAN ASSISTANT

## 2025-08-09 PROCEDURE — 81003 URINALYSIS AUTO W/O SCOPE: CPT | Performed by: NURSE PRACTITIONER

## 2025-08-09 PROCEDURE — 85025 COMPLETE CBC W/AUTO DIFF WBC: CPT | Performed by: NURSE PRACTITIONER

## 2025-08-09 PROCEDURE — 93005 ELECTROCARDIOGRAM TRACING: CPT

## 2025-08-09 PROCEDURE — 80047 BASIC METABLC PNL IONIZED CA: CPT

## 2025-08-09 PROCEDURE — 63600175 PHARM REV CODE 636 W HCPCS: Mod: JZ,TB | Performed by: NURSE PRACTITIONER

## 2025-08-09 PROCEDURE — 82962 GLUCOSE BLOOD TEST: CPT

## 2025-08-09 PROCEDURE — 93010 ELECTROCARDIOGRAM REPORT: CPT | Mod: ,,, | Performed by: INTERNAL MEDICINE

## 2025-08-09 PROCEDURE — 96374 THER/PROPH/DIAG INJ IV PUSH: CPT

## 2025-08-09 PROCEDURE — 83690 ASSAY OF LIPASE: CPT | Performed by: NURSE PRACTITIONER

## 2025-08-09 PROCEDURE — 99285 EMERGENCY DEPT VISIT HI MDM: CPT | Mod: 25

## 2025-08-09 PROCEDURE — 96375 TX/PRO/DX INJ NEW DRUG ADDON: CPT

## 2025-08-09 PROCEDURE — 87522 HEPATITIS C REVRS TRNSCRPJ: CPT | Performed by: PHYSICIAN ASSISTANT

## 2025-08-09 RX ORDER — LIDOCAINE 50 MG/G
2 PATCH TOPICAL DAILY
Qty: 14 PATCH | Refills: 0 | Status: SHIPPED | OUTPATIENT
Start: 2025-08-09 | End: 2025-08-16

## 2025-08-09 RX ORDER — METHOCARBAMOL 750 MG/1
TABLET, FILM COATED ORAL
Qty: 30 TABLET | Refills: 0 | Status: SHIPPED | OUTPATIENT
Start: 2025-08-09 | End: 2025-08-09

## 2025-08-09 RX ORDER — KETOROLAC TROMETHAMINE 30 MG/ML
15 INJECTION, SOLUTION INTRAMUSCULAR; INTRAVENOUS
Status: COMPLETED | OUTPATIENT
Start: 2025-08-09 | End: 2025-08-09

## 2025-08-09 RX ORDER — METHOCARBAMOL 750 MG/1
TABLET, FILM COATED ORAL
Qty: 30 TABLET | Refills: 0 | Status: SHIPPED | OUTPATIENT
Start: 2025-08-09

## 2025-08-09 RX ORDER — IBUPROFEN 600 MG/1
600 TABLET, FILM COATED ORAL EVERY 6 HOURS PRN
Qty: 20 TABLET | Refills: 0 | Status: SHIPPED | OUTPATIENT
Start: 2025-08-09 | End: 2025-08-09

## 2025-08-09 RX ORDER — IBUPROFEN 600 MG/1
600 TABLET, FILM COATED ORAL EVERY 6 HOURS PRN
Qty: 20 TABLET | Refills: 0 | Status: SHIPPED | OUTPATIENT
Start: 2025-08-09 | End: 2025-08-14

## 2025-08-09 RX ORDER — LIDOCAINE 50 MG/G
2 PATCH TOPICAL DAILY
Qty: 14 PATCH | Refills: 0 | Status: SHIPPED | OUTPATIENT
Start: 2025-08-09 | End: 2025-08-09

## 2025-08-09 RX ORDER — METHOCARBAMOL 100 MG/ML
1000 INJECTION, SOLUTION INTRAMUSCULAR; INTRAVENOUS
Status: COMPLETED | OUTPATIENT
Start: 2025-08-09 | End: 2025-08-09

## 2025-08-09 RX ADMIN — IOHEXOL 100 ML: 350 INJECTION, SOLUTION INTRAVENOUS at 11:08

## 2025-08-09 RX ADMIN — METHOCARBAMOL 1000 MG: 100 INJECTION INTRAMUSCULAR; INTRAVENOUS at 11:08

## 2025-08-09 RX ADMIN — KETOROLAC TROMETHAMINE 15 MG: 30 INJECTION, SOLUTION INTRAMUSCULAR; INTRAVENOUS at 11:08

## 2025-08-10 LAB — HOLD SPECIMEN: NORMAL

## 2025-08-11 LAB — HCV RNA SERPL NAA+PROBE-LOG IU: NOT DETECTED {LOG_IU}/ML

## 2025-08-12 ENCOUNTER — PATIENT MESSAGE (OUTPATIENT)
Dept: BARIATRICS | Facility: CLINIC | Age: 65
End: 2025-08-12
Payer: MEDICARE

## 2025-08-12 LAB — HOLD SPECIMEN: NORMAL

## 2025-08-14 ENCOUNTER — PATIENT MESSAGE (OUTPATIENT)
Dept: PSYCHIATRY | Facility: CLINIC | Age: 65
End: 2025-08-14
Payer: MEDICARE

## 2025-08-14 ENCOUNTER — PATIENT OUTREACH (OUTPATIENT)
Facility: OTHER | Age: 65
End: 2025-08-14
Payer: MEDICARE

## 2025-08-14 ENCOUNTER — CLINICAL SUPPORT (OUTPATIENT)
Dept: BARIATRICS | Facility: CLINIC | Age: 65
End: 2025-08-14
Payer: MEDICARE

## 2025-08-14 ENCOUNTER — OFFICE VISIT (OUTPATIENT)
Dept: BARIATRICS | Facility: CLINIC | Age: 65
End: 2025-08-14
Payer: MEDICARE

## 2025-08-14 VITALS
WEIGHT: 245.13 LBS | WEIGHT: 245.81 LBS | HEIGHT: 66 IN | DIASTOLIC BLOOD PRESSURE: 74 MMHG | OXYGEN SATURATION: 98 % | BODY MASS INDEX: 39.68 KG/M2 | SYSTOLIC BLOOD PRESSURE: 148 MMHG | HEART RATE: 64 BPM | BODY MASS INDEX: 39.4 KG/M2

## 2025-08-14 DIAGNOSIS — E66.813 CLASS 3 SEVERE OBESITY DUE TO EXCESS CALORIES WITHOUT SERIOUS COMORBIDITY WITH BODY MASS INDEX (BMI) OF 45.0 TO 49.9 IN ADULT: ICD-10-CM

## 2025-08-14 DIAGNOSIS — D53.9 NUTRITIONAL ANEMIA, UNSPECIFIED: ICD-10-CM

## 2025-08-14 DIAGNOSIS — E78.5 HYPERLIPIDEMIA ASSOCIATED WITH TYPE 2 DIABETES MELLITUS: ICD-10-CM

## 2025-08-14 DIAGNOSIS — E11.69 HYPERLIPIDEMIA ASSOCIATED WITH TYPE 2 DIABETES MELLITUS: ICD-10-CM

## 2025-08-14 DIAGNOSIS — Z86.711 HISTORY OF PULMONARY EMBOLISM: ICD-10-CM

## 2025-08-14 DIAGNOSIS — E66.9 OBESITY (BMI 30-39.9): ICD-10-CM

## 2025-08-14 DIAGNOSIS — Z71.3 DIETARY COUNSELING: Primary | ICD-10-CM

## 2025-08-14 DIAGNOSIS — I10 PRIMARY HYPERTENSION: ICD-10-CM

## 2025-08-14 DIAGNOSIS — I47.10 SVT (SUPRAVENTRICULAR TACHYCARDIA): Primary | ICD-10-CM

## 2025-08-14 DIAGNOSIS — E11.9 TYPE 2 DIABETES MELLITUS WITHOUT COMPLICATION, WITHOUT LONG-TERM CURRENT USE OF INSULIN: ICD-10-CM

## 2025-08-14 DIAGNOSIS — Z79.01 CHRONIC ANTICOAGULATION: ICD-10-CM

## 2025-08-14 PROCEDURE — 99999 PR PBB SHADOW E&M-EST. PATIENT-LVL V: CPT | Mod: PBBFAC,,, | Performed by: PHYSICIAN ASSISTANT

## 2025-08-14 RX ORDER — ISOSORBIDE MONONITRATE 60 MG/1
60 TABLET, EXTENDED RELEASE ORAL DAILY
COMMUNITY
Start: 2025-06-20

## 2025-08-14 RX ORDER — CLOPIDOGREL BISULFATE 75 MG/1
75 TABLET ORAL DAILY
COMMUNITY
Start: 2025-07-14

## 2025-08-14 RX ORDER — SUCRALFATE 1 G/1
1 TABLET ORAL 3 TIMES DAILY
COMMUNITY
Start: 2025-05-22

## 2025-08-14 RX ORDER — CARVEDILOL 12.5 MG/1
12.5 TABLET ORAL 2 TIMES DAILY
COMMUNITY
Start: 2025-07-24

## 2025-08-21 ENCOUNTER — TELEPHONE (OUTPATIENT)
Dept: GASTROENTEROLOGY | Facility: CLINIC | Age: 65
End: 2025-08-21
Payer: MEDICARE

## 2025-08-21 ENCOUNTER — TELEPHONE (OUTPATIENT)
Dept: BARIATRICS | Facility: CLINIC | Age: 65
End: 2025-08-21
Payer: MEDICARE

## 2025-08-22 ENCOUNTER — PATIENT OUTREACH (OUTPATIENT)
Facility: OTHER | Age: 65
End: 2025-08-22
Payer: MEDICARE

## 2025-08-22 ENCOUNTER — TELEPHONE (OUTPATIENT)
Dept: GASTROENTEROLOGY | Facility: CLINIC | Age: 65
End: 2025-08-22
Payer: MEDICARE

## 2025-08-23 ENCOUNTER — HOSPITAL ENCOUNTER (EMERGENCY)
Facility: HOSPITAL | Age: 65
Discharge: HOME | End: 2025-08-23
Attending: STUDENT IN AN ORGANIZED HEALTH CARE EDUCATION/TRAINING PROGRAM
Payer: MEDICARE

## 2025-08-23 DIAGNOSIS — M79.671 BILATERAL FOOT PAIN: ICD-10-CM

## 2025-08-23 DIAGNOSIS — M79.672 BILATERAL FOOT PAIN: ICD-10-CM

## 2025-08-23 PROCEDURE — 25000003 PHARM REV CODE 250

## 2025-08-23 PROCEDURE — 99284 EMERGENCY DEPT VISIT MOD MDM: CPT | Mod: 25

## 2025-08-23 RX ORDER — OXYCODONE HYDROCHLORIDE 5 MG/1
5 TABLET ORAL
Refills: 0 | Status: COMPLETED | OUTPATIENT
Start: 2025-08-23 | End: 2025-08-23

## 2025-08-23 RX ORDER — ACETAMINOPHEN 500 MG
1000 TABLET ORAL
Status: COMPLETED | OUTPATIENT
Start: 2025-08-23 | End: 2025-08-23

## 2025-08-23 RX ORDER — OXYCODONE AND ACETAMINOPHEN 5; 325 MG/1; MG/1
1 TABLET ORAL EVERY 6 HOURS PRN
Qty: 12 TABLET | Refills: 0 | Status: SHIPPED | OUTPATIENT
Start: 2025-08-23

## 2025-08-23 RX ADMIN — OXYCODONE HYDROCHLORIDE 5 MG: 5 TABLET ORAL at 10:08

## 2025-08-23 RX ADMIN — ACETAMINOPHEN 1000 MG: 500 TABLET ORAL at 10:08

## 2025-08-24 VITALS
RESPIRATION RATE: 18 BRPM | SYSTOLIC BLOOD PRESSURE: 128 MMHG | HEART RATE: 64 BPM | BODY MASS INDEX: 39.21 KG/M2 | HEIGHT: 66 IN | DIASTOLIC BLOOD PRESSURE: 73 MMHG | OXYGEN SATURATION: 98 % | WEIGHT: 244 LBS | TEMPERATURE: 98 F

## 2025-08-25 ENCOUNTER — TELEPHONE (OUTPATIENT)
Dept: GASTROENTEROLOGY | Facility: CLINIC | Age: 65
End: 2025-08-25
Payer: MEDICARE

## 2025-08-27 ENCOUNTER — TELEPHONE (OUTPATIENT)
Dept: GASTROENTEROLOGY | Facility: CLINIC | Age: 65
End: 2025-08-27
Payer: MEDICARE

## 2025-08-29 ENCOUNTER — PATIENT OUTREACH (OUTPATIENT)
Facility: OTHER | Age: 65
End: 2025-08-29
Payer: MEDICARE

## (undated) DEVICE — SEAL LENS SCOPE MYOSURE

## (undated) DEVICE — SOL POVIDONE SCRUB IODINE 4 OZ

## (undated) DEVICE — SOL POVIDONE PREP IODINE 4 OZ

## (undated) DEVICE — SOL NACL IRR 3000ML

## (undated) DEVICE — GLOVE SENSICARE PI SURG 6

## (undated) DEVICE — Device

## (undated) DEVICE — PACK FLUENT DISPOSABLE

## (undated) DEVICE — SET BASIN 48X48IN 6000ML RING

## (undated) DEVICE — SOL IRR SOD CHL .9% POUR

## (undated) DEVICE — DEVICE MYOSURE REACH